# Patient Record
Sex: FEMALE | Race: OTHER | NOT HISPANIC OR LATINO
[De-identification: names, ages, dates, MRNs, and addresses within clinical notes are randomized per-mention and may not be internally consistent; named-entity substitution may affect disease eponyms.]

---

## 2017-05-25 ENCOUNTER — RESULT REVIEW (OUTPATIENT)
Age: 27
End: 2017-05-25

## 2017-07-06 ENCOUNTER — ASOB RESULT (OUTPATIENT)
Age: 27
End: 2017-07-06

## 2017-07-06 ENCOUNTER — APPOINTMENT (OUTPATIENT)
Dept: ANTEPARTUM | Facility: CLINIC | Age: 27
End: 2017-07-06

## 2017-08-24 ENCOUNTER — APPOINTMENT (OUTPATIENT)
Dept: ANTEPARTUM | Facility: CLINIC | Age: 27
End: 2017-08-24
Payer: COMMERCIAL

## 2017-08-24 ENCOUNTER — ASOB RESULT (OUTPATIENT)
Age: 27
End: 2017-08-24

## 2017-08-24 PROCEDURE — 76811 OB US DETAILED SNGL FETUS: CPT

## 2017-09-20 ENCOUNTER — LABORATORY RESULT (OUTPATIENT)
Age: 27
End: 2017-09-20

## 2017-09-20 ENCOUNTER — ASOB RESULT (OUTPATIENT)
Age: 27
End: 2017-09-20

## 2017-09-20 ENCOUNTER — APPOINTMENT (OUTPATIENT)
Dept: ANTEPARTUM | Facility: CLINIC | Age: 27
End: 2017-09-20
Payer: COMMERCIAL

## 2017-09-20 DIAGNOSIS — O35.9XX0 MATERNAL CARE FOR (SUSPECTED) FETAL ABNORMALITY AND DAMAGE, UNSPECIFIED, NOT APPLICABLE OR UNSPECIFIED: ICD-10-CM

## 2017-09-20 PROCEDURE — 99241 OFFICE CONSULTATION NEW/ESTAB PATIENT 15 MIN: CPT | Mod: 25

## 2017-09-20 PROCEDURE — 76816 OB US FOLLOW-UP PER FETUS: CPT

## 2017-09-20 PROCEDURE — 76816 OB US FOLLOW-UP PER FETUS: CPT | Mod: 59

## 2017-09-25 ENCOUNTER — FORM ENCOUNTER (OUTPATIENT)
Age: 27
End: 2017-09-25

## 2017-09-26 ENCOUNTER — OUTPATIENT (OUTPATIENT)
Dept: OUTPATIENT SERVICES | Age: 27
LOS: 1 days | End: 2017-09-26

## 2017-09-26 ENCOUNTER — APPOINTMENT (OUTPATIENT)
Dept: MRI IMAGING | Facility: HOSPITAL | Age: 27
End: 2017-09-26
Payer: COMMERCIAL

## 2017-09-26 DIAGNOSIS — O35.9XX0 MATERNAL CARE FOR (SUSPECTED) FETAL ABNORMALITY AND DAMAGE, UNSPECIFIED, NOT APPLICABLE OR UNSPECIFIED: ICD-10-CM

## 2017-09-26 PROCEDURE — 74712 MRI FETAL SNGL/1ST GESTATION: CPT | Mod: 26

## 2017-10-20 ENCOUNTER — APPOINTMENT (OUTPATIENT)
Dept: ANTEPARTUM | Facility: CLINIC | Age: 27
End: 2017-10-20
Payer: COMMERCIAL

## 2017-10-20 ENCOUNTER — ASOB RESULT (OUTPATIENT)
Age: 27
End: 2017-10-20

## 2017-10-20 PROCEDURE — 76816 OB US FOLLOW-UP PER FETUS: CPT

## 2017-11-15 ENCOUNTER — APPOINTMENT (OUTPATIENT)
Dept: ANTEPARTUM | Facility: CLINIC | Age: 27
End: 2017-11-15
Payer: COMMERCIAL

## 2017-11-15 ENCOUNTER — ASOB RESULT (OUTPATIENT)
Age: 27
End: 2017-11-15

## 2017-11-15 PROCEDURE — 76816 OB US FOLLOW-UP PER FETUS: CPT

## 2017-12-14 ENCOUNTER — APPOINTMENT (OUTPATIENT)
Dept: ANTEPARTUM | Facility: CLINIC | Age: 27
End: 2017-12-14

## 2017-12-14 ENCOUNTER — ASOB RESULT (OUTPATIENT)
Age: 27
End: 2017-12-14

## 2017-12-14 ENCOUNTER — APPOINTMENT (OUTPATIENT)
Dept: ANTEPARTUM | Facility: CLINIC | Age: 27
End: 2017-12-14
Payer: COMMERCIAL

## 2017-12-14 PROCEDURE — 76816 OB US FOLLOW-UP PER FETUS: CPT

## 2017-12-18 ENCOUNTER — APPOINTMENT (OUTPATIENT)
Dept: ANTEPARTUM | Facility: CLINIC | Age: 27
End: 2017-12-18
Payer: COMMERCIAL

## 2017-12-18 ENCOUNTER — OUTPATIENT (OUTPATIENT)
Dept: OUTPATIENT SERVICES | Facility: HOSPITAL | Age: 27
LOS: 1 days | End: 2017-12-18
Payer: COMMERCIAL

## 2017-12-18 ENCOUNTER — ASOB RESULT (OUTPATIENT)
Age: 27
End: 2017-12-18

## 2017-12-18 PROCEDURE — 76818 FETAL BIOPHYS PROFILE W/NST: CPT | Mod: 26

## 2017-12-18 PROCEDURE — 76818 FETAL BIOPHYS PROFILE W/NST: CPT

## 2017-12-19 ENCOUNTER — OUTPATIENT (OUTPATIENT)
Dept: OUTPATIENT SERVICES | Facility: HOSPITAL | Age: 27
LOS: 1 days | End: 2017-12-19
Payer: COMMERCIAL

## 2017-12-19 DIAGNOSIS — O26.899 OTHER SPECIFIED PREGNANCY RELATED CONDITIONS, UNSPECIFIED TRIMESTER: ICD-10-CM

## 2017-12-19 DIAGNOSIS — Z3A.00 WEEKS OF GESTATION OF PREGNANCY NOT SPECIFIED: ICD-10-CM

## 2017-12-19 LAB
ALBUMIN SERPL ELPH-MCNC: 3 G/DL — LOW (ref 3.3–5)
ALP SERPL-CCNC: 140 U/L — HIGH (ref 40–120)
ALT FLD-CCNC: 13 U/L RC — SIGNIFICANT CHANGE UP (ref 10–45)
ANION GAP SERPL CALC-SCNC: 12 MMOL/L — SIGNIFICANT CHANGE UP (ref 5–17)
APPEARANCE UR: CLEAR — SIGNIFICANT CHANGE UP
APTT BLD: 27.3 SEC — LOW (ref 27.5–37.4)
AST SERPL-CCNC: 19 U/L — SIGNIFICANT CHANGE UP (ref 10–40)
BACTERIA # UR AUTO: ABNORMAL /HPF
BASOPHILS # BLD AUTO: 0.1 K/UL — SIGNIFICANT CHANGE UP (ref 0–0.2)
BASOPHILS NFR BLD AUTO: 0.7 % — SIGNIFICANT CHANGE UP (ref 0–2)
BILIRUB SERPL-MCNC: 0.2 MG/DL — SIGNIFICANT CHANGE UP (ref 0.2–1.2)
BILIRUB UR-MCNC: NEGATIVE — SIGNIFICANT CHANGE UP
BUN SERPL-MCNC: 15 MG/DL — SIGNIFICANT CHANGE UP (ref 7–23)
CALCIUM SERPL-MCNC: 8.9 MG/DL — SIGNIFICANT CHANGE UP (ref 8.4–10.5)
CHLORIDE SERPL-SCNC: 104 MMOL/L — SIGNIFICANT CHANGE UP (ref 96–108)
CO2 SERPL-SCNC: 23 MMOL/L — SIGNIFICANT CHANGE UP (ref 22–31)
COLOR SPEC: YELLOW — SIGNIFICANT CHANGE UP
COMMENT - URINE: SIGNIFICANT CHANGE UP
CREAT SERPL-MCNC: 0.88 MG/DL — SIGNIFICANT CHANGE UP (ref 0.5–1.3)
DIFF PNL FLD: ABNORMAL
EOSINOPHIL # BLD AUTO: 0.2 K/UL — SIGNIFICANT CHANGE UP (ref 0–0.5)
EOSINOPHIL NFR BLD AUTO: 1.5 % — SIGNIFICANT CHANGE UP (ref 0–6)
EPI CELLS # UR: SIGNIFICANT CHANGE UP /HPF
FIBRINOGEN PPP-MCNC: 596 MG/DL — HIGH (ref 310–510)
GLUCOSE SERPL-MCNC: 76 MG/DL — SIGNIFICANT CHANGE UP (ref 70–99)
GLUCOSE UR QL: NEGATIVE — SIGNIFICANT CHANGE UP
HCT VFR BLD CALC: 41 % — SIGNIFICANT CHANGE UP (ref 34.5–45)
HGB BLD-MCNC: 14 G/DL — SIGNIFICANT CHANGE UP (ref 11.5–15.5)
HYALINE CASTS # UR AUTO: ABNORMAL
INR BLD: 0.91 RATIO — SIGNIFICANT CHANGE UP (ref 0.88–1.16)
KETONES UR-MCNC: NEGATIVE — SIGNIFICANT CHANGE UP
LDH SERPL L TO P-CCNC: 221 U/L — SIGNIFICANT CHANGE UP (ref 50–242)
LEUKOCYTE ESTERASE UR-ACNC: NEGATIVE — SIGNIFICANT CHANGE UP
LYMPHOCYTES # BLD AUTO: 2.8 K/UL — SIGNIFICANT CHANGE UP (ref 1–3.3)
LYMPHOCYTES # BLD AUTO: 27.5 % — SIGNIFICANT CHANGE UP (ref 13–44)
MCHC RBC-ENTMCNC: 31.8 PG — SIGNIFICANT CHANGE UP (ref 27–34)
MCHC RBC-ENTMCNC: 34.1 GM/DL — SIGNIFICANT CHANGE UP (ref 32–36)
MCV RBC AUTO: 93.4 FL — SIGNIFICANT CHANGE UP (ref 80–100)
MONOCYTES # BLD AUTO: 0.7 K/UL — SIGNIFICANT CHANGE UP (ref 0–0.9)
MONOCYTES NFR BLD AUTO: 7.3 % — SIGNIFICANT CHANGE UP (ref 2–14)
NEUTROPHILS # BLD AUTO: 6.3 K/UL — SIGNIFICANT CHANGE UP (ref 1.8–7.4)
NEUTROPHILS NFR BLD AUTO: 63 % — SIGNIFICANT CHANGE UP (ref 43–77)
NITRITE UR-MCNC: NEGATIVE — SIGNIFICANT CHANGE UP
PH UR: 6.5 — SIGNIFICANT CHANGE UP (ref 5–8)
PLATELET # BLD AUTO: 197 K/UL — SIGNIFICANT CHANGE UP (ref 150–400)
POTASSIUM SERPL-MCNC: 4.8 MMOL/L — SIGNIFICANT CHANGE UP (ref 3.5–5.3)
POTASSIUM SERPL-SCNC: 4.8 MMOL/L — SIGNIFICANT CHANGE UP (ref 3.5–5.3)
PROT SERPL-MCNC: 5.4 G/DL — LOW (ref 6–8.3)
PROT UR-MCNC: 500 MG/DL
PROTHROM AB SERPL-ACNC: 9.8 SEC — SIGNIFICANT CHANGE UP (ref 9.8–12.7)
RBC # BLD: 4.39 M/UL — SIGNIFICANT CHANGE UP (ref 3.8–5.2)
RBC # FLD: 13.1 % — SIGNIFICANT CHANGE UP (ref 10.3–14.5)
RBC CASTS # UR COMP ASSIST: ABNORMAL /HPF (ref 0–2)
SODIUM SERPL-SCNC: 139 MMOL/L — SIGNIFICANT CHANGE UP (ref 135–145)
SP GR SPEC: 1.03 — HIGH (ref 1.01–1.02)
URATE SERPL-MCNC: 1.9 MG/DL — LOW (ref 2.5–7)
UROBILINOGEN FLD QL: NEGATIVE — SIGNIFICANT CHANGE UP
WBC # BLD: 10.1 K/UL — SIGNIFICANT CHANGE UP (ref 3.8–10.5)
WBC # FLD AUTO: 10.1 K/UL — SIGNIFICANT CHANGE UP (ref 3.8–10.5)
WBC UR QL: SIGNIFICANT CHANGE UP /HPF (ref 0–5)

## 2017-12-19 PROCEDURE — 80053 COMPREHEN METABOLIC PANEL: CPT

## 2017-12-19 PROCEDURE — G0463: CPT

## 2017-12-19 PROCEDURE — 83615 LACTATE (LD) (LDH) ENZYME: CPT

## 2017-12-19 PROCEDURE — 59025 FETAL NON-STRESS TEST: CPT

## 2017-12-19 PROCEDURE — 84156 ASSAY OF PROTEIN URINE: CPT

## 2017-12-19 PROCEDURE — 85384 FIBRINOGEN ACTIVITY: CPT

## 2017-12-19 PROCEDURE — 84550 ASSAY OF BLOOD/URIC ACID: CPT

## 2017-12-19 PROCEDURE — 85730 THROMBOPLASTIN TIME PARTIAL: CPT

## 2017-12-19 PROCEDURE — 85027 COMPLETE CBC AUTOMATED: CPT

## 2017-12-19 PROCEDURE — 85610 PROTHROMBIN TIME: CPT

## 2017-12-19 PROCEDURE — 86780 TREPONEMA PALLIDUM: CPT

## 2017-12-19 PROCEDURE — 81001 URINALYSIS AUTO W/SCOPE: CPT

## 2017-12-19 RX ORDER — ACETAMINOPHEN 500 MG
975 TABLET ORAL ONCE
Qty: 0 | Refills: 0 | Status: COMPLETED | OUTPATIENT
Start: 2017-12-19 | End: 2017-12-19

## 2017-12-19 RX ADMIN — Medication 975 MILLIGRAM(S): at 21:35

## 2017-12-20 DIAGNOSIS — Z01.818 ENCOUNTER FOR OTHER PREPROCEDURAL EXAMINATION: ICD-10-CM

## 2017-12-20 LAB
CREAT ?TM UR-MCNC: 168 MG/DL — SIGNIFICANT CHANGE UP
PROT ?TM UR-MCNC: 625 MG/DL — HIGH (ref 0–12)
PROT/CREAT UR-RTO: 3.7 RATIO — HIGH (ref 0–0.2)
T PALLIDUM AB TITR SER: NEGATIVE — SIGNIFICANT CHANGE UP

## 2017-12-21 ENCOUNTER — OUTPATIENT (OUTPATIENT)
Dept: OUTPATIENT SERVICES | Facility: HOSPITAL | Age: 27
LOS: 1 days | End: 2017-12-21
Payer: COMMERCIAL

## 2017-12-21 DIAGNOSIS — O35.0XX0 MATERNAL CARE FOR (SUSPECTED) CENTRAL NERVOUS SYSTEM MALFORMATION IN FETUS, NOT APPLICABLE OR UNSPECIFIED: ICD-10-CM

## 2017-12-21 DIAGNOSIS — O34.219 MATERNAL CARE FOR UNSPECIFIED TYPE SCAR FROM PREVIOUS CESAREAN DELIVERY: ICD-10-CM

## 2017-12-21 DIAGNOSIS — Z01.818 ENCOUNTER FOR OTHER PREPROCEDURAL EXAMINATION: ICD-10-CM

## 2017-12-21 LAB
BLD GP AB SCN SERPL QL: NEGATIVE — SIGNIFICANT CHANGE UP
HCT VFR BLD CALC: 41 % — SIGNIFICANT CHANGE UP (ref 34.5–45)
HGB BLD-MCNC: 13.6 G/DL — SIGNIFICANT CHANGE UP (ref 11.5–15.5)
MCHC RBC-ENTMCNC: 29.7 PG — SIGNIFICANT CHANGE UP (ref 27–34)
MCHC RBC-ENTMCNC: 33.2 GM/DL — SIGNIFICANT CHANGE UP (ref 32–36)
MCV RBC AUTO: 89.5 FL — SIGNIFICANT CHANGE UP (ref 80–100)
PLATELET # BLD AUTO: 199 K/UL — SIGNIFICANT CHANGE UP (ref 150–400)
RBC # BLD: 4.58 M/UL — SIGNIFICANT CHANGE UP (ref 3.8–5.2)
RBC # FLD: 14.1 % — SIGNIFICANT CHANGE UP (ref 10.3–14.5)
RH IG SCN BLD-IMP: POSITIVE — SIGNIFICANT CHANGE UP
WBC # BLD: 9.45 K/UL — SIGNIFICANT CHANGE UP (ref 3.8–10.5)
WBC # FLD AUTO: 9.45 K/UL — SIGNIFICANT CHANGE UP (ref 3.8–10.5)

## 2017-12-21 PROCEDURE — G0463: CPT

## 2017-12-21 PROCEDURE — 86900 BLOOD TYPING SEROLOGIC ABO: CPT

## 2017-12-21 PROCEDURE — 86850 RBC ANTIBODY SCREEN: CPT

## 2017-12-21 PROCEDURE — 85027 COMPLETE CBC AUTOMATED: CPT

## 2017-12-21 PROCEDURE — 86901 BLOOD TYPING SEROLOGIC RH(D): CPT

## 2017-12-21 RX ORDER — SODIUM CHLORIDE 9 MG/ML
1000 INJECTION, SOLUTION INTRAVENOUS ONCE
Qty: 0 | Refills: 0 | Status: COMPLETED | OUTPATIENT
Start: 2017-12-22 | End: 2017-12-22

## 2017-12-21 RX ORDER — LIDOCAINE HCL 20 MG/ML
0.2 VIAL (ML) INJECTION ONCE
Qty: 0 | Refills: 0 | Status: DISCONTINUED | OUTPATIENT
Start: 2017-12-23 | End: 2017-12-23

## 2017-12-21 RX ORDER — SODIUM CHLORIDE 9 MG/ML
1000 INJECTION, SOLUTION INTRAVENOUS
Qty: 0 | Refills: 0 | Status: DISCONTINUED | OUTPATIENT
Start: 2017-12-22 | End: 2017-12-23

## 2017-12-22 ENCOUNTER — INPATIENT (INPATIENT)
Facility: HOSPITAL | Age: 27
LOS: 2 days | Discharge: HOME | End: 2017-12-25
Attending: OBSTETRICS & GYNECOLOGY | Admitting: OBSTETRICS & GYNECOLOGY
Payer: COMMERCIAL

## 2017-12-22 VITALS — WEIGHT: 182.98 LBS

## 2017-12-22 DIAGNOSIS — O34.219 MATERNAL CARE FOR UNSPECIFIED TYPE SCAR FROM PREVIOUS CESAREAN DELIVERY: ICD-10-CM

## 2017-12-22 LAB
ALBUMIN SERPL ELPH-MCNC: 2.9 G/DL — LOW (ref 3.3–5)
ALP SERPL-CCNC: 137 U/L — HIGH (ref 40–120)
ALT FLD-CCNC: 15 U/L RC — SIGNIFICANT CHANGE UP (ref 10–45)
ANION GAP SERPL CALC-SCNC: 12 MMOL/L — SIGNIFICANT CHANGE UP (ref 5–17)
APPEARANCE UR: ABNORMAL
APTT BLD: 27.1 SEC — LOW (ref 27.5–37.4)
AST SERPL-CCNC: 21 U/L — SIGNIFICANT CHANGE UP (ref 10–40)
BACTERIA # UR AUTO: ABNORMAL /HPF
BASOPHILS # BLD AUTO: 0.1 K/UL — SIGNIFICANT CHANGE UP (ref 0–0.2)
BASOPHILS NFR BLD AUTO: 0.6 % — SIGNIFICANT CHANGE UP (ref 0–2)
BILIRUB SERPL-MCNC: 0.2 MG/DL — SIGNIFICANT CHANGE UP (ref 0.2–1.2)
BILIRUB UR-MCNC: NEGATIVE — SIGNIFICANT CHANGE UP
BUN SERPL-MCNC: 13 MG/DL — SIGNIFICANT CHANGE UP (ref 7–23)
CALCIUM SERPL-MCNC: 8.5 MG/DL — SIGNIFICANT CHANGE UP (ref 8.4–10.5)
CHLORIDE SERPL-SCNC: 105 MMOL/L — SIGNIFICANT CHANGE UP (ref 96–108)
CO2 SERPL-SCNC: 21 MMOL/L — LOW (ref 22–31)
COLOR SPEC: YELLOW — SIGNIFICANT CHANGE UP
COMMENT - URINE: SIGNIFICANT CHANGE UP
CREAT ?TM UR-MCNC: 334 MG/DL — SIGNIFICANT CHANGE UP
CREAT SERPL-MCNC: 0.79 MG/DL — SIGNIFICANT CHANGE UP (ref 0.5–1.3)
DIFF PNL FLD: ABNORMAL
EOSINOPHIL # BLD AUTO: 0.1 K/UL — SIGNIFICANT CHANGE UP (ref 0–0.5)
EOSINOPHIL NFR BLD AUTO: 0.6 % — SIGNIFICANT CHANGE UP (ref 0–6)
EPI CELLS # UR: SIGNIFICANT CHANGE UP /HPF
FIBRINOGEN PPP-MCNC: 597 MG/DL — HIGH (ref 310–510)
GLUCOSE SERPL-MCNC: 68 MG/DL — LOW (ref 70–99)
GLUCOSE UR QL: NEGATIVE — SIGNIFICANT CHANGE UP
HCT VFR BLD CALC: 41.8 % — SIGNIFICANT CHANGE UP (ref 34.5–45)
HGB BLD-MCNC: 14.5 G/DL — SIGNIFICANT CHANGE UP (ref 11.5–15.5)
HYALINE CASTS # UR AUTO: ABNORMAL
INR BLD: 0.87 RATIO — LOW (ref 0.88–1.16)
KETONES UR-MCNC: NEGATIVE — SIGNIFICANT CHANGE UP
LDH SERPL L TO P-CCNC: 266 U/L — HIGH (ref 50–242)
LEUKOCYTE ESTERASE UR-ACNC: NEGATIVE — SIGNIFICANT CHANGE UP
LYMPHOCYTES # BLD AUTO: 2.1 K/UL — SIGNIFICANT CHANGE UP (ref 1–3.3)
LYMPHOCYTES # BLD AUTO: 23.1 % — SIGNIFICANT CHANGE UP (ref 13–44)
MCHC RBC-ENTMCNC: 32.5 PG — SIGNIFICANT CHANGE UP (ref 27–34)
MCHC RBC-ENTMCNC: 34.6 GM/DL — SIGNIFICANT CHANGE UP (ref 32–36)
MCV RBC AUTO: 93.9 FL — SIGNIFICANT CHANGE UP (ref 80–100)
MONOCYTES # BLD AUTO: 0.6 K/UL — SIGNIFICANT CHANGE UP (ref 0–0.9)
MONOCYTES NFR BLD AUTO: 6.3 % — SIGNIFICANT CHANGE UP (ref 2–14)
NEUTROPHILS # BLD AUTO: 6.4 K/UL — SIGNIFICANT CHANGE UP (ref 1.8–7.4)
NEUTROPHILS NFR BLD AUTO: 69.5 % — SIGNIFICANT CHANGE UP (ref 43–77)
NITRITE UR-MCNC: NEGATIVE — SIGNIFICANT CHANGE UP
PH UR: 6.5 — SIGNIFICANT CHANGE UP (ref 5–8)
PLATELET # BLD AUTO: 197 K/UL — SIGNIFICANT CHANGE UP (ref 150–400)
POTASSIUM SERPL-MCNC: 4.6 MMOL/L — SIGNIFICANT CHANGE UP (ref 3.5–5.3)
POTASSIUM SERPL-SCNC: 4.6 MMOL/L — SIGNIFICANT CHANGE UP (ref 3.5–5.3)
PROT ?TM UR-MCNC: 1623 MG/DL — HIGH (ref 0–12)
PROT SERPL-MCNC: 6 G/DL — SIGNIFICANT CHANGE UP (ref 6–8.3)
PROT UR-MCNC: >600 MG/DL
PROT/CREAT UR-RTO: 4.9 RATIO — HIGH (ref 0–0.2)
PROTHROM AB SERPL-ACNC: 9.5 SEC — LOW (ref 9.8–12.7)
RBC # BLD: 4.45 M/UL — SIGNIFICANT CHANGE UP (ref 3.8–5.2)
RBC # FLD: 13.3 % — SIGNIFICANT CHANGE UP (ref 10.3–14.5)
RBC CASTS # UR COMP ASSIST: SIGNIFICANT CHANGE UP /HPF (ref 0–2)
SODIUM SERPL-SCNC: 138 MMOL/L — SIGNIFICANT CHANGE UP (ref 135–145)
SP GR SPEC: >1.03 — HIGH (ref 1.01–1.02)
URATE SERPL-MCNC: 2.2 MG/DL — LOW (ref 2.5–7)
UROBILINOGEN FLD QL: NEGATIVE — SIGNIFICANT CHANGE UP
WBC # BLD: 9.2 K/UL — SIGNIFICANT CHANGE UP (ref 3.8–10.5)
WBC # FLD AUTO: 9.2 K/UL — SIGNIFICANT CHANGE UP (ref 3.8–10.5)
WBC UR QL: ABNORMAL /HPF (ref 0–5)

## 2017-12-22 RX ORDER — FAMOTIDINE 10 MG/ML
20 INJECTION INTRAVENOUS ONCE
Qty: 0 | Refills: 0 | Status: COMPLETED | OUTPATIENT
Start: 2017-12-22 | End: 2017-12-22

## 2017-12-22 RX ORDER — KETOROLAC TROMETHAMINE 30 MG/ML
30 SYRINGE (ML) INJECTION EVERY 6 HOURS
Qty: 0 | Refills: 0 | Status: DISCONTINUED | OUTPATIENT
Start: 2017-12-22 | End: 2017-12-23

## 2017-12-22 RX ORDER — DOCUSATE SODIUM 100 MG
100 CAPSULE ORAL
Qty: 0 | Refills: 0 | Status: DISCONTINUED | OUTPATIENT
Start: 2017-12-23 | End: 2017-12-25

## 2017-12-22 RX ORDER — OXYTOCIN 10 UNIT/ML
41.67 VIAL (ML) INJECTION
Qty: 20 | Refills: 0 | Status: DISCONTINUED | OUTPATIENT
Start: 2017-12-22 | End: 2017-12-23

## 2017-12-22 RX ORDER — METOCLOPRAMIDE HCL 10 MG
10 TABLET ORAL ONCE
Qty: 0 | Refills: 0 | Status: COMPLETED | OUTPATIENT
Start: 2017-12-22 | End: 2017-12-22

## 2017-12-22 RX ORDER — CEFAZOLIN SODIUM 1 G
2000 VIAL (EA) INJECTION ONCE
Qty: 0 | Refills: 0 | Status: COMPLETED | OUTPATIENT
Start: 2017-12-22 | End: 2017-12-22

## 2017-12-22 RX ORDER — CITRIC ACID/SODIUM CITRATE 300-500 MG
30 SOLUTION, ORAL ORAL ONCE
Qty: 0 | Refills: 0 | Status: COMPLETED | OUTPATIENT
Start: 2017-12-22 | End: 2017-12-22

## 2017-12-22 RX ORDER — ACETAMINOPHEN 500 MG
975 TABLET ORAL EVERY 6 HOURS
Qty: 0 | Refills: 0 | Status: DISCONTINUED | OUTPATIENT
Start: 2017-12-22 | End: 2017-12-25

## 2017-12-22 RX ORDER — DIPHENHYDRAMINE HCL 50 MG
25 CAPSULE ORAL EVERY 6 HOURS
Qty: 0 | Refills: 0 | Status: DISCONTINUED | OUTPATIENT
Start: 2017-12-23 | End: 2017-12-25

## 2017-12-22 RX ORDER — IBUPROFEN 200 MG
600 TABLET ORAL EVERY 6 HOURS
Qty: 0 | Refills: 0 | Status: COMPLETED | OUTPATIENT
Start: 2017-12-22 | End: 2018-11-20

## 2017-12-22 RX ORDER — ACETAMINOPHEN 500 MG
975 TABLET ORAL EVERY 6 HOURS
Qty: 0 | Refills: 0 | Status: DISCONTINUED | OUTPATIENT
Start: 2017-12-22 | End: 2017-12-23

## 2017-12-22 RX ORDER — OXYTOCIN 10 UNIT/ML
333.33 VIAL (ML) INJECTION
Qty: 20 | Refills: 0 | Status: DISCONTINUED | OUTPATIENT
Start: 2017-12-22 | End: 2017-12-23

## 2017-12-22 RX ORDER — IBUPROFEN 200 MG
600 TABLET ORAL EVERY 6 HOURS
Qty: 0 | Refills: 0 | Status: DISCONTINUED | OUTPATIENT
Start: 2017-12-22 | End: 2017-12-23

## 2017-12-22 RX ORDER — OXYCODONE HYDROCHLORIDE 5 MG/1
5 TABLET ORAL EVERY 4 HOURS
Qty: 0 | Refills: 0 | Status: COMPLETED | OUTPATIENT
Start: 2017-12-22 | End: 2017-12-29

## 2017-12-22 RX ORDER — SODIUM CHLORIDE 9 MG/ML
500 INJECTION, SOLUTION INTRAVENOUS ONCE
Qty: 0 | Refills: 0 | Status: COMPLETED | OUTPATIENT
Start: 2017-12-22 | End: 2017-12-22

## 2017-12-22 RX ORDER — OXYCODONE HYDROCHLORIDE 5 MG/1
5 TABLET ORAL
Qty: 0 | Refills: 0 | Status: COMPLETED | OUTPATIENT
Start: 2017-12-22 | End: 2017-12-29

## 2017-12-22 RX ORDER — SODIUM CHLORIDE 9 MG/ML
1000 INJECTION, SOLUTION INTRAVENOUS
Qty: 0 | Refills: 0 | Status: DISCONTINUED | OUTPATIENT
Start: 2017-12-22 | End: 2017-12-23

## 2017-12-22 RX ORDER — MAGNESIUM SULFATE 500 MG/ML
4 VIAL (ML) INJECTION ONCE
Qty: 0 | Refills: 0 | Status: COMPLETED | OUTPATIENT
Start: 2017-12-22 | End: 2017-12-23

## 2017-12-22 RX ORDER — KETOROLAC TROMETHAMINE 30 MG/ML
30 SYRINGE (ML) INJECTION EVERY 6 HOURS
Qty: 0 | Refills: 0 | Status: DISCONTINUED | OUTPATIENT
Start: 2017-12-22 | End: 2017-12-22

## 2017-12-22 RX ORDER — TETANUS TOXOID, REDUCED DIPHTHERIA TOXOID AND ACELLULAR PERTUSSIS VACCINE, ADSORBED 5; 2.5; 8; 8; 2.5 [IU]/.5ML; [IU]/.5ML; UG/.5ML; UG/.5ML; UG/.5ML
0.5 SUSPENSION INTRAMUSCULAR ONCE
Qty: 0 | Refills: 0 | Status: DISCONTINUED | OUTPATIENT
Start: 2017-12-23 | End: 2017-12-25

## 2017-12-22 RX ORDER — OXYCODONE HYDROCHLORIDE 5 MG/1
5 TABLET ORAL EVERY 4 HOURS
Qty: 0 | Refills: 0 | Status: DISCONTINUED | OUTPATIENT
Start: 2017-12-22 | End: 2017-12-23

## 2017-12-22 RX ORDER — MAGNESIUM SULFATE 500 MG/ML
2 VIAL (ML) INJECTION
Qty: 40 | Refills: 0 | Status: DISCONTINUED | OUTPATIENT
Start: 2017-12-22 | End: 2017-12-23

## 2017-12-22 RX ORDER — OXYTOCIN 10 UNIT/ML
41.67 VIAL (ML) INJECTION
Qty: 20 | Refills: 0 | Status: DISCONTINUED | OUTPATIENT
Start: 2017-12-23 | End: 2017-12-25

## 2017-12-22 RX ORDER — OXYCODONE HYDROCHLORIDE 5 MG/1
5 TABLET ORAL
Qty: 0 | Refills: 0 | Status: DISCONTINUED | OUTPATIENT
Start: 2017-12-22 | End: 2017-12-23

## 2017-12-22 RX ORDER — NALOXONE HYDROCHLORIDE 4 MG/.1ML
0.1 SPRAY NASAL
Qty: 0 | Refills: 0 | Status: DISCONTINUED | OUTPATIENT
Start: 2017-12-22 | End: 2017-12-23

## 2017-12-22 RX ORDER — LANOLIN
1 OINTMENT (GRAM) TOPICAL
Qty: 0 | Refills: 0 | Status: DISCONTINUED | OUTPATIENT
Start: 2017-12-23 | End: 2017-12-25

## 2017-12-22 RX ORDER — FERROUS SULFATE 325(65) MG
325 TABLET ORAL DAILY
Qty: 0 | Refills: 0 | Status: DISCONTINUED | OUTPATIENT
Start: 2017-12-23 | End: 2017-12-25

## 2017-12-22 RX ORDER — ONDANSETRON 8 MG/1
4 TABLET, FILM COATED ORAL EVERY 6 HOURS
Qty: 0 | Refills: 0 | Status: DISCONTINUED | OUTPATIENT
Start: 2017-12-22 | End: 2017-12-23

## 2017-12-22 RX ORDER — HEPARIN SODIUM 5000 [USP'U]/ML
5000 INJECTION INTRAVENOUS; SUBCUTANEOUS EVERY 12 HOURS
Qty: 0 | Refills: 0 | Status: DISCONTINUED | OUTPATIENT
Start: 2017-12-23 | End: 2017-12-25

## 2017-12-22 RX ORDER — DEXAMETHASONE 0.5 MG/5ML
4 ELIXIR ORAL EVERY 6 HOURS
Qty: 0 | Refills: 0 | Status: DISCONTINUED | OUTPATIENT
Start: 2017-12-22 | End: 2017-12-23

## 2017-12-22 RX ORDER — GLYCERIN ADULT
1 SUPPOSITORY, RECTAL RECTAL AT BEDTIME
Qty: 0 | Refills: 0 | Status: DISCONTINUED | OUTPATIENT
Start: 2017-12-23 | End: 2017-12-25

## 2017-12-22 RX ORDER — SIMETHICONE 80 MG/1
80 TABLET, CHEWABLE ORAL EVERY 4 HOURS
Qty: 0 | Refills: 0 | Status: DISCONTINUED | OUTPATIENT
Start: 2017-12-23 | End: 2017-12-25

## 2017-12-22 RX ADMIN — ONDANSETRON 4 MILLIGRAM(S): 8 TABLET, FILM COATED ORAL at 21:21

## 2017-12-22 RX ADMIN — SODIUM CHLORIDE 1000 MILLILITER(S): 9 INJECTION, SOLUTION INTRAVENOUS at 22:15

## 2017-12-22 RX ADMIN — Medication 100 MILLIGRAM(S): at 16:45

## 2017-12-22 RX ADMIN — SODIUM CHLORIDE 2000 MILLILITER(S): 9 INJECTION, SOLUTION INTRAVENOUS at 15:00

## 2017-12-22 RX ADMIN — Medication 10 MILLIGRAM(S): at 19:09

## 2017-12-22 RX ADMIN — Medication 30 MILLILITER(S): at 16:14

## 2017-12-22 RX ADMIN — FAMOTIDINE 20 MILLIGRAM(S): 10 INJECTION INTRAVENOUS at 16:14

## 2017-12-22 NOTE — PROVIDER CONTACT NOTE (OTHER) - ASSESSMENT
Pt BPs are 140s/90s   Pt reporting mild headache 3/10 pain, pt denies any visual disturbances or epigastric pain  Bleeding is WNL, uterus firm and 1 below the umbilicus and urine output is 20ml hr

## 2017-12-23 ENCOUNTER — TRANSCRIPTION ENCOUNTER (OUTPATIENT)
Age: 27
End: 2017-12-23

## 2017-12-23 LAB
ALBUMIN SERPL ELPH-MCNC: 2.4 G/DL — LOW (ref 3.3–5)
ALP SERPL-CCNC: 112 U/L — SIGNIFICANT CHANGE UP (ref 40–120)
ALT FLD-CCNC: 11 U/L RC — SIGNIFICANT CHANGE UP (ref 10–45)
ANION GAP SERPL CALC-SCNC: 10 MMOL/L — SIGNIFICANT CHANGE UP (ref 5–17)
APPEARANCE UR: ABNORMAL
APTT BLD: 27.2 SEC — LOW (ref 27.5–37.4)
AST SERPL-CCNC: 22 U/L — SIGNIFICANT CHANGE UP (ref 10–40)
BASOPHILS # BLD AUTO: 0 K/UL — SIGNIFICANT CHANGE UP (ref 0–0.2)
BASOPHILS # BLD AUTO: 0.1 K/UL — SIGNIFICANT CHANGE UP (ref 0–0.2)
BASOPHILS NFR BLD AUTO: 0.3 % — SIGNIFICANT CHANGE UP (ref 0–2)
BASOPHILS NFR BLD AUTO: 0.5 % — SIGNIFICANT CHANGE UP (ref 0–2)
BILIRUB SERPL-MCNC: 0.3 MG/DL — SIGNIFICANT CHANGE UP (ref 0.2–1.2)
BILIRUB UR-MCNC: NEGATIVE — SIGNIFICANT CHANGE UP
BUN SERPL-MCNC: 11 MG/DL — SIGNIFICANT CHANGE UP (ref 7–23)
CALCIUM SERPL-MCNC: 7.8 MG/DL — LOW (ref 8.4–10.5)
CHLORIDE SERPL-SCNC: 104 MMOL/L — SIGNIFICANT CHANGE UP (ref 96–108)
CO2 SERPL-SCNC: 20 MMOL/L — LOW (ref 22–31)
COLOR SPEC: YELLOW — SIGNIFICANT CHANGE UP
COMMENT - URINE: SIGNIFICANT CHANGE UP
CREAT SERPL-MCNC: 0.61 MG/DL — SIGNIFICANT CHANGE UP (ref 0.5–1.3)
DIFF PNL FLD: ABNORMAL
EOSINOPHIL # BLD AUTO: 0 K/UL — SIGNIFICANT CHANGE UP (ref 0–0.5)
EOSINOPHIL # BLD AUTO: 0.1 K/UL — SIGNIFICANT CHANGE UP (ref 0–0.5)
EOSINOPHIL NFR BLD AUTO: 0.3 % — SIGNIFICANT CHANGE UP (ref 0–6)
EOSINOPHIL NFR BLD AUTO: 0.5 % — SIGNIFICANT CHANGE UP (ref 0–6)
EPI CELLS # UR: SIGNIFICANT CHANGE UP /HPF
FIBRINOGEN PPP-MCNC: 522 MG/DL — HIGH (ref 310–510)
GLUCOSE SERPL-MCNC: 99 MG/DL — SIGNIFICANT CHANGE UP (ref 70–99)
GLUCOSE UR QL: NEGATIVE — SIGNIFICANT CHANGE UP
HCT VFR BLD CALC: 37.6 % — SIGNIFICANT CHANGE UP (ref 34.5–45)
HCT VFR BLD CALC: 39.3 % — SIGNIFICANT CHANGE UP (ref 34.5–45)
HGB BLD-MCNC: 13.2 G/DL — SIGNIFICANT CHANGE UP (ref 11.5–15.5)
HGB BLD-MCNC: 13.2 G/DL — SIGNIFICANT CHANGE UP (ref 11.5–15.5)
INR BLD: 0.88 RATIO — SIGNIFICANT CHANGE UP (ref 0.88–1.16)
KETONES UR-MCNC: ABNORMAL
LDH SERPL L TO P-CCNC: 268 U/L — HIGH (ref 50–242)
LEUKOCYTE ESTERASE UR-ACNC: NEGATIVE — SIGNIFICANT CHANGE UP
LYMPHOCYTES # BLD AUTO: 14.8 % — SIGNIFICANT CHANGE UP (ref 13–44)
LYMPHOCYTES # BLD AUTO: 17.1 % — SIGNIFICANT CHANGE UP (ref 13–44)
LYMPHOCYTES # BLD AUTO: 2 K/UL — SIGNIFICANT CHANGE UP (ref 1–3.3)
LYMPHOCYTES # BLD AUTO: 2.2 K/UL — SIGNIFICANT CHANGE UP (ref 1–3.3)
MAGNESIUM SERPL-MCNC: 4.9 MG/DL — HIGH (ref 1.6–2.6)
MAGNESIUM SERPL-MCNC: 5.9 MG/DL — HIGH (ref 1.6–2.6)
MCHC RBC-ENTMCNC: 31.3 PG — SIGNIFICANT CHANGE UP (ref 27–34)
MCHC RBC-ENTMCNC: 32.5 PG — SIGNIFICANT CHANGE UP (ref 27–34)
MCHC RBC-ENTMCNC: 33.6 GM/DL — SIGNIFICANT CHANGE UP (ref 32–36)
MCHC RBC-ENTMCNC: 35.1 GM/DL — SIGNIFICANT CHANGE UP (ref 32–36)
MCV RBC AUTO: 92.7 FL — SIGNIFICANT CHANGE UP (ref 80–100)
MCV RBC AUTO: 93 FL — SIGNIFICANT CHANGE UP (ref 80–100)
MONOCYTES # BLD AUTO: 0.9 K/UL — SIGNIFICANT CHANGE UP (ref 0–0.9)
MONOCYTES # BLD AUTO: 0.9 K/UL — SIGNIFICANT CHANGE UP (ref 0–0.9)
MONOCYTES NFR BLD AUTO: 6.7 % — SIGNIFICANT CHANGE UP (ref 2–14)
MONOCYTES NFR BLD AUTO: 6.7 % — SIGNIFICANT CHANGE UP (ref 2–14)
NEUTROPHILS # BLD AUTO: 10.5 K/UL — HIGH (ref 1.8–7.4)
NEUTROPHILS # BLD AUTO: 9.9 K/UL — HIGH (ref 1.8–7.4)
NEUTROPHILS NFR BLD AUTO: 75.2 % — SIGNIFICANT CHANGE UP (ref 43–77)
NEUTROPHILS NFR BLD AUTO: 78 % — HIGH (ref 43–77)
NITRITE UR-MCNC: NEGATIVE — SIGNIFICANT CHANGE UP
PH UR: 6.5 — SIGNIFICANT CHANGE UP (ref 5–8)
PLATELET # BLD AUTO: 167 K/UL — SIGNIFICANT CHANGE UP (ref 150–400)
PLATELET # BLD AUTO: 179 K/UL — SIGNIFICANT CHANGE UP (ref 150–400)
POTASSIUM SERPL-MCNC: 4.1 MMOL/L — SIGNIFICANT CHANGE UP (ref 3.5–5.3)
POTASSIUM SERPL-SCNC: 4.1 MMOL/L — SIGNIFICANT CHANGE UP (ref 3.5–5.3)
PROT SERPL-MCNC: 5 G/DL — LOW (ref 6–8.3)
PROT UR-MCNC: >600 MG/DL
PROTHROM AB SERPL-ACNC: 9.6 SEC — LOW (ref 9.8–12.7)
RBC # BLD: 4.06 M/UL — SIGNIFICANT CHANGE UP (ref 3.8–5.2)
RBC # BLD: 4.23 M/UL — SIGNIFICANT CHANGE UP (ref 3.8–5.2)
RBC # FLD: 13 % — SIGNIFICANT CHANGE UP (ref 10.3–14.5)
RBC # FLD: 13.1 % — SIGNIFICANT CHANGE UP (ref 10.3–14.5)
RBC CASTS # UR COMP ASSIST: >50 /HPF (ref 0–2)
SODIUM SERPL-SCNC: 134 MMOL/L — LOW (ref 135–145)
SP GR SPEC: >1.03 — HIGH (ref 1.01–1.02)
T PALLIDUM AB TITR SER: NEGATIVE — SIGNIFICANT CHANGE UP
URATE SERPL-MCNC: 1.8 MG/DL — LOW (ref 2.5–7)
UROBILINOGEN FLD QL: NEGATIVE — SIGNIFICANT CHANGE UP
WBC # BLD: 13.1 K/UL — HIGH (ref 3.8–10.5)
WBC # BLD: 13.4 K/UL — HIGH (ref 3.8–10.5)
WBC # FLD AUTO: 13.1 K/UL — HIGH (ref 3.8–10.5)
WBC # FLD AUTO: 13.4 K/UL — HIGH (ref 3.8–10.5)
WBC UR QL: ABNORMAL /HPF (ref 0–5)

## 2017-12-23 RX ORDER — SODIUM CHLORIDE 9 MG/ML
1000 INJECTION, SOLUTION INTRAVENOUS
Qty: 0 | Refills: 0 | Status: DISCONTINUED | OUTPATIENT
Start: 2017-12-23 | End: 2017-12-23

## 2017-12-23 RX ADMIN — HEPARIN SODIUM 5000 UNIT(S): 5000 INJECTION INTRAVENOUS; SUBCUTANEOUS at 17:37

## 2017-12-23 RX ADMIN — HEPARIN SODIUM 5000 UNIT(S): 5000 INJECTION INTRAVENOUS; SUBCUTANEOUS at 00:24

## 2017-12-23 RX ADMIN — Medication 30 MILLIGRAM(S): at 17:41

## 2017-12-23 RX ADMIN — Medication 50 GM/HR: at 00:35

## 2017-12-23 RX ADMIN — Medication 30 MILLIGRAM(S): at 18:52

## 2017-12-23 RX ADMIN — Medication 30 MILLIGRAM(S): at 01:40

## 2017-12-23 RX ADMIN — SODIUM CHLORIDE 125 MILLILITER(S): 9 INJECTION, SOLUTION INTRAVENOUS at 07:16

## 2017-12-23 RX ADMIN — Medication 300 GRAM(S): at 00:15

## 2017-12-23 RX ADMIN — Medication 30 MILLIGRAM(S): at 01:12

## 2017-12-23 NOTE — PROGRESS NOTE ADULT - SUBJECTIVE AND OBJECTIVE BOX
Day 1 of Anesthesia Pain Management Service    SUBJECTIVE: I'm okay  Pain Scale Score	At rest: ___ 	With Activity: ___ 	[  x ] Refer to charted pain scores    THERAPY:  [ ] Epidural Bupivacaine 0.0625% and Hydromorphone 10 micrograms/mL  [ ] Epidural Ropivacaine 0.2% plain   [ x ] Epidural Bupivacaine 0.01 % and Fentanyl 3 micrograms/mL  (OB)    Demand dose 3 mL  Lockout  15 minutes   Continuous Rate 10mL    MEDICATIONS  (STANDING):  acetaminophen   Tablet. 975 milliGRAM(s) Oral every 6 hours  acetaminophen   Tablet. 975 milliGRAM(s) Oral every 6 hours  diphtheria/tetanus/pertussis (acellular) Vaccine (ADAcel) 0.5 milliLiter(s) IntraMuscular once  fentaNYL (3 MICROgram(s)/mL) + BUpivacaine 0.01% in 0.9% Sodium Chloride PCEA 250 milliLiter(s) Epidural PCA Continuous  ferrous    sulfate 325 milliGRAM(s) Oral daily  heparin  Injectable 5000 Unit(s) SubCutaneous every 12 hours  ibuprofen  Tablet 600 milliGRAM(s) Oral every 6 hours  ibuprofen  Tablet 600 milliGRAM(s) Oral every 6 hours  ketorolac   Injectable 30 milliGRAM(s) IV Push every 6 hours  lactated ringers. 1000 milliLiter(s) (125 mL/Hr) IV Continuous <Continuous>  lactated ringers. 1000 milliLiter(s) (125 mL/Hr) IV Continuous <Continuous>  lactated ringers. 1000 milliLiter(s) (125 mL/Hr) IV Continuous <Continuous>  lidocaine 1% Injectable 0.2 milliLiter(s) Local Injection once  magnesium sulfate Infusion 2 Gm/Hr (50 mL/Hr) IV Continuous <Continuous>  oxyCODONE    IR 5 milliGRAM(s) Oral every 3 hours  oxyCODONE    IR 5 milliGRAM(s) Oral every 3 hours  oxytocin Infusion 333.333 milliUNIT(s)/Min (1000 mL/Hr) IV Continuous <Continuous>  oxytocin Infusion 41.667 milliUNIT(s)/Min (125 mL/Hr) IV Continuous <Continuous>  oxytocin Infusion 41.667 milliUNIT(s)/Min (125 mL/Hr) IV Continuous <Continuous>  oxytocin Infusion 333.333 milliUNIT(s)/Min (1000 mL/Hr) IV Continuous <Continuous>  oxytocin Infusion 41.667 milliUNIT(s)/Min (125 mL/Hr) IV Continuous <Continuous>  prenatal multivitamin 1 Tablet(s) Oral daily    MEDICATIONS  (PRN):  dexamethasone  Injectable 4 milliGRAM(s) IV Push every 6 hours PRN Nausea, IF ondansetron is ineffective after 30 - 60 minutes  diphenhydrAMINE   Capsule 25 milliGRAM(s) Oral every 6 hours PRN Itching  docusate sodium 100 milliGRAM(s) Oral two times a day PRN Stool Softening  fentaNYL (3 MICROgram(s)/mL) + BUpivacaine 0.01% in 0.9% Sodium Chloride PCEA Rescue Clinician Bolus 5 milliLiter(s) Epidural every 15 minutes PRN Moderate Pain (4 - 6)  glycerin Suppository - Adult 1 Suppository(s) Rectal at bedtime PRN Constipation  lanolin Ointment 1 Application(s) Topical every 3 hours PRN Sore Nipples  naloxone Injectable 0.1 milliGRAM(s) IV Push every 3 minutes PRN For ANY of the following changes in patient status:  A. RR LESS THAN 10 breaths per minute, B. Oxygen saturation LESS THAN 90%, C. Sedation score of 6  ondansetron Injectable 4 milliGRAM(s) IV Push every 6 hours PRN Nausea  oxyCODONE    IR 5 milliGRAM(s) Oral every 4 hours PRN Severe Pain (7 - 10)  oxyCODONE    IR 5 milliGRAM(s) Oral every 4 hours PRN Severe Pain (7 - 10)  simethicone 80 milliGRAM(s) Chew every 4 hours PRN Gas      OBJECTIVE:    Assessment of Epidural Catheter Site: 	    [x  ] Dressing intact	[x ] Site non-tender	[x ] Site without erythema, discharge, edema  [x  ] Epidural tubing and connection checked	[ x] Gross neurological exam within normal limits  [ ] Catheter removed – tip intact		    PT/INR - ( 23 Dec 2017 00:46 )   PT: 9.6 sec;   INR: 0.88 ratio         PTT - ( 23 Dec 2017 00:46 )  PTT:27.2 sec                      13.2   13.1  )-----------( 167      ( 23 Dec 2017 04:50 )             39.3     Vital Signs Last 24 Hrs  T(C): 36.8 (12-23-17 @ 06:50), Max: 36.9 (12-22-17 @ 18:35)  T(F): 98.2 (12-23-17 @ 06:50), Max: 98.4 (12-22-17 @ 18:35)  HR: 82 (12-23-17 @ 10:50) (62 - 100)  BP: 104/66 (12-23-17 @ 10:50) (104/66 - 146/92)  BP(mean): 81 (12-23-17 @ 10:50) (79 - 116)  RR: 21 (12-23-17 @ 10:50) (16 - 32)  SpO2: 97% (12-23-17 @ 10:50) (95% - 100%)      Sedation Score:	[x ] Alert	[ ] Drowsy	[ ] Arousable  [ ] Asleep  [ ] Unresponsive    Side Effects:	[ x] None	[ ] Nausea	[ ] Vomiting  [ ] Pruritus  		[ ] Weakness  [ ] Numbness  [ ] Other:    ASSESSMENT/ PLAN:    Therapy to  be:	[  x] Continue   [ ] Discontinued   [ ] Change to prn Analgesics    Documentation and Verification of current medications:  [ X ] Done	[ ] Not done, not eligible, reason:    Comments:

## 2017-12-23 NOTE — PROGRESS NOTE ADULT - ASSESSMENT
42 year old  at 33 weeks 1 days GA JACK 2018 with HF diagnosed in pregnancy EF  21% admitted for scheduled admission for cardiac optimization. HD 25, cardiovascularly stable.    #CHF  -No signs of CHF exacerbation this AM, no chest pain.   - Normal sinus with occasional sinus tach on tele overnight as per tele nurse, HR 80s-100s.   -Telemetry, daily weights  -Continue with Toprol, Hydralazine, Lasix  -Appreciate Cardiology recs for optimization  -plan for delivery between 35 and 36 weeks or sooner if indicated; as per heart failure team, magnesium and procardia acceptable for  labor if indicated    -Consensus from interdisciplinary meeting on 17: based on increased blood volume as pregnancy progresses, opinion of Cardiology team is to maintain in hospital, especially as the patient has had multiple ED visits. The timing of delivery, by consensus, would be sometime between 35-36 weeks, depending on the clinical circumstances. The route of delivery will also depend on the clinical circumstances, as well as cervical exam. Although it has been 13 years since her last delivery, the patient has had 5 vaginal deliveries, so her labor and second stage may be quick. However, some studies show that in women with a long interval since the last delivery, the labor may be longer than expected. In the case of Ms. Peterson, that may be relevant as a long induction, in a grand multipara at 42 years old, may be etiologic for obstetrical hemorrhage. In the setting of a cardiomyopathy, this may increase the risk to the mother. Patient will be on l+D until 4 cm then transferred to OR for labor. Will have arterial line, can have epidural. If CS will have GETA. Hemorrhage kit, hysterectomy tray, H-kit all present in OR. Post partum recovery will be in the CCU. Patient should not undergo tubal ligation unless she has a  section to avoid the risks of elective surgery. Patient consented for Mirena. Patient will recover in the CCU postpartum.  - Patient counseled on LARCs and consented for Mirena IUD on 17.  - Plan for interdisciplinary meeting today, recommendations pending.  #UTI  -s/p keflex  -UCx () no growth (f)  -No signs of labor at this time    #MWB  -GI: DASH Diet  -Activity: OOB  -Heme: HSQ 7500 BID for DVT PPX  -T+S    #FWB  -PNV  -NICU consult when closer to delivery  - Tuesday/Thursday NST, Monday BPPs  -GBS negative () A/P: 28 yo  a/w sPEC, s/p pLTCS(), POD#1    Pt currently stable, BP well controlled.   Neuro: Pain controlled, No headache at this time. Magnesium for neuroprotection  CV: Hemodynamically stable, blood pressure well controlled with labetalol 200mg PO TID  Pulm: O2 Sat wnl on RA  GI: Reg diet  : UOP adequate  Heme: SCDs for DVT ppx  ID: Afebrile  Dispo: Continue routine care    SANTHOSH Morgan, PGY3

## 2017-12-23 NOTE — PROGRESS NOTE ADULT - SUBJECTIVE AND OBJECTIVE BOX
R3 Antepartum Progess Note HD#25  S: Patient seen and examined at bedside. No acute overnight events. Denies LOF/VB/ctx.  +FM.     MEDICATIONS  (STANDING):  acetaminophen   Tablet. 975 milliGRAM(s) Oral every 6 hours  acetaminophen   Tablet. 975 milliGRAM(s) Oral every 6 hours  diphtheria/tetanus/pertussis (acellular) Vaccine (ADAcel) 0.5 milliLiter(s) IntraMuscular once  fentaNYL (3 MICROgram(s)/mL) + BUpivacaine 0.01% in 0.9% Sodium Chloride PCEA 250 milliLiter(s) Epidural PCA Continuous  ferrous    sulfate 325 milliGRAM(s) Oral daily  heparin  Injectable 5000 Unit(s) SubCutaneous every 12 hours  ibuprofen  Tablet 600 milliGRAM(s) Oral every 6 hours  ibuprofen  Tablet 600 milliGRAM(s) Oral every 6 hours  ketorolac   Injectable 30 milliGRAM(s) IV Push every 6 hours  lactated ringers. 1000 milliLiter(s) (125 mL/Hr) IV Continuous <Continuous>  lactated ringers. 1000 milliLiter(s) (125 mL/Hr) IV Continuous <Continuous>  lactated ringers. 1000 milliLiter(s) (125 mL/Hr) IV Continuous <Continuous>  lidocaine 1% Injectable 0.2 milliLiter(s) Local Injection once  magnesium sulfate Infusion 2 Gm/Hr (50 mL/Hr) IV Continuous <Continuous>  oxyCODONE    IR 5 milliGRAM(s) Oral every 3 hours  oxyCODONE    IR 5 milliGRAM(s) Oral every 3 hours  oxytocin Infusion 333.333 milliUNIT(s)/Min (1000 mL/Hr) IV Continuous <Continuous>  oxytocin Infusion 41.667 milliUNIT(s)/Min (125 mL/Hr) IV Continuous <Continuous>  oxytocin Infusion 41.667 milliUNIT(s)/Min (125 mL/Hr) IV Continuous <Continuous>  oxytocin Infusion 333.333 milliUNIT(s)/Min (1000 mL/Hr) IV Continuous <Continuous>  oxytocin Infusion 41.667 milliUNIT(s)/Min (125 mL/Hr) IV Continuous <Continuous>  prenatal multivitamin 1 Tablet(s) Oral daily    MEDICATIONS  (PRN):  dexamethasone  Injectable 4 milliGRAM(s) IV Push every 6 hours PRN Nausea, IF ondansetron is ineffective after 30 - 60 minutes  diphenhydrAMINE   Capsule 25 milliGRAM(s) Oral every 6 hours PRN Itching  docusate sodium 100 milliGRAM(s) Oral two times a day PRN Stool Softening  fentaNYL (3 MICROgram(s)/mL) + BUpivacaine 0.01% in 0.9% Sodium Chloride PCEA Rescue Clinician Bolus 5 milliLiter(s) Epidural every 15 minutes PRN Moderate Pain (4 - 6)  glycerin Suppository - Adult 1 Suppository(s) Rectal at bedtime PRN Constipation  lanolin Ointment 1 Application(s) Topical every 3 hours PRN Sore Nipples  naloxone Injectable 0.1 milliGRAM(s) IV Push every 3 minutes PRN For ANY of the following changes in patient status:  A. RR LESS THAN 10 breaths per minute, B. Oxygen saturation LESS THAN 90%, C. Sedation score of 6  ondansetron Injectable 4 milliGRAM(s) IV Push every 6 hours PRN Nausea  oxyCODONE    IR 5 milliGRAM(s) Oral every 4 hours PRN Severe Pain (7 - 10)  oxyCODONE    IR 5 milliGRAM(s) Oral every 4 hours PRN Severe Pain (7 - 10)  simethicone 80 milliGRAM(s) Chew every 4 hours PRN Gas    Vital Signs Last 24 Hrs  T(C): 36.6 (23 Dec 2017 04:50), Max: 36.9 (22 Dec 2017 18:35)  T(F): 97.9 (23 Dec 2017 04:50), Max: 98.4 (22 Dec 2017 18:35)  HR: 68 (23 Dec 2017 05:05) (62 - 100)  BP: 133/81 (23 Dec 2017 05:05) (109/58 - 146/92)  BP(mean): 102 (23 Dec 2017 05:05) (79 - 116)  RR: 20 (23 Dec 2017 05:05) (16 - 32)  SpO2: 99% (23 Dec 2017 05:05) (95% - 100%)      PHYSICAL EXAM:    GA: NAD, A+0 x 3  CV: RRR  Pulm: CTA BL  Abd: gravid, soft, nontender, nondistended  Extremities: no swelling or calf tenderness    LABS:                        13.2   13.1  )-----------( 167      ( 23 Dec 2017 04:50 )             39.3     12-23    134<L>  |  104  |  11  ----------------------------<  99  4.1   |  20<L>  |  0.61    Ca    7.8<L>      23 Dec 2017 00:46  Mg     4.9     12-23    TPro  5.0<L>  /  Alb  2.4<L>  /  TBili  0.3  /  DBili  x   /  AST  22  /  ALT  11  /  AlkPhos  112  12-23    PT/INR - ( 23 Dec 2017 00:46 )   PT: 9.6 sec;   INR: 0.88 ratio         PTT - ( 23 Dec 2017 00:46 )  PTT:27.2 sec  Urinalysis Basic - ( 23 Dec 2017 01:06 )    Color: x / Appearance: x / SG: x / pH: x  Gluc: x / Ketone: x  / Bili: x / Urobili: x   Blood: x / Protein: x / Nitrite: x   Leuk Esterase: x / RBC: >50 /HPF / WBC 10-25 /HPF   Sq Epi: x / Non Sq Epi: Occasional /HPF / Bacteria: x R3 Postpartum Note  S: Pt examined at the bedside. No acute events overnight. Pt denies headache, visual changes, epigastric pain, RUQ pain. Denies chest pain, SOB, abdominal pain, n/v, fever or chills.     MEDICATIONS  (STANDING):  acetaminophen   Tablet. 975 milliGRAM(s) Oral every 6 hours  acetaminophen   Tablet. 975 milliGRAM(s) Oral every 6 hours  diphtheria/tetanus/pertussis (acellular) Vaccine (ADAcel) 0.5 milliLiter(s) IntraMuscular once  fentaNYL (3 MICROgram(s)/mL) + BUpivacaine 0.01% in 0.9% Sodium Chloride PCEA 250 milliLiter(s) Epidural PCA Continuous  ferrous    sulfate 325 milliGRAM(s) Oral daily  heparin  Injectable 5000 Unit(s) SubCutaneous every 12 hours  ibuprofen  Tablet 600 milliGRAM(s) Oral every 6 hours  ibuprofen  Tablet 600 milliGRAM(s) Oral every 6 hours  ketorolac   Injectable 30 milliGRAM(s) IV Push every 6 hours  lactated ringers. 1000 milliLiter(s) (125 mL/Hr) IV Continuous <Continuous>  lactated ringers. 1000 milliLiter(s) (125 mL/Hr) IV Continuous <Continuous>  lactated ringers. 1000 milliLiter(s) (125 mL/Hr) IV Continuous <Continuous>  lidocaine 1% Injectable 0.2 milliLiter(s) Local Injection once  magnesium sulfate Infusion 2 Gm/Hr (50 mL/Hr) IV Continuous <Continuous>  oxyCODONE    IR 5 milliGRAM(s) Oral every 3 hours  oxyCODONE    IR 5 milliGRAM(s) Oral every 3 hours  oxytocin Infusion 333.333 milliUNIT(s)/Min (1000 mL/Hr) IV Continuous <Continuous>  oxytocin Infusion 41.667 milliUNIT(s)/Min (125 mL/Hr) IV Continuous <Continuous>  oxytocin Infusion 41.667 milliUNIT(s)/Min (125 mL/Hr) IV Continuous <Continuous>  oxytocin Infusion 333.333 milliUNIT(s)/Min (1000 mL/Hr) IV Continuous <Continuous>  oxytocin Infusion 41.667 milliUNIT(s)/Min (125 mL/Hr) IV Continuous <Continuous>  prenatal multivitamin 1 Tablet(s) Oral daily    MEDICATIONS  (PRN):  dexamethasone  Injectable 4 milliGRAM(s) IV Push every 6 hours PRN Nausea, IF ondansetron is ineffective after 30 - 60 minutes  diphenhydrAMINE   Capsule 25 milliGRAM(s) Oral every 6 hours PRN Itching  docusate sodium 100 milliGRAM(s) Oral two times a day PRN Stool Softening  fentaNYL (3 MICROgram(s)/mL) + BUpivacaine 0.01% in 0.9% Sodium Chloride PCEA Rescue Clinician Bolus 5 milliLiter(s) Epidural every 15 minutes PRN Moderate Pain (4 - 6)  glycerin Suppository - Adult 1 Suppository(s) Rectal at bedtime PRN Constipation  lanolin Ointment 1 Application(s) Topical every 3 hours PRN Sore Nipples  naloxone Injectable 0.1 milliGRAM(s) IV Push every 3 minutes PRN For ANY of the following changes in patient status:  A. RR LESS THAN 10 breaths per minute, B. Oxygen saturation LESS THAN 90%, C. Sedation score of 6  ondansetron Injectable 4 milliGRAM(s) IV Push every 6 hours PRN Nausea  oxyCODONE    IR 5 milliGRAM(s) Oral every 4 hours PRN Severe Pain (7 - 10)  oxyCODONE    IR 5 milliGRAM(s) Oral every 4 hours PRN Severe Pain (7 - 10)  simethicone 80 milliGRAM(s) Chew every 4 hours PRN Gas    Vital Signs Last 24 Hrs  T(C): 36.6 (23 Dec 2017 04:50), Max: 36.9 (22 Dec 2017 18:35)  T(F): 97.9 (23 Dec 2017 04:50), Max: 98.4 (22 Dec 2017 18:35)  HR: 68 (23 Dec 2017 05:05) (62 - 100)  BP: 133/81 (23 Dec 2017 05:05) (109/58 - 146/92)  BP(mean): 102 (23 Dec 2017 05:05) (79 - 116)  RR: 20 (23 Dec 2017 05:05) (16 - 32)  SpO2: 99% (23 Dec 2017 05:05) (95% - 100%)      PHYSICAL EXAM:    GA: NAD, A+0 x 3  CV: RRR  Pulm: CTA BL  Abd: gravid, soft, nontender, nondistended  Extremities: no swelling or calf tenderness    LABS:                        13.2   13.1  )-----------( 167      ( 23 Dec 2017 04:50 )             39.3     12-23    134<L>  |  104  |  11  ----------------------------<  99  4.1   |  20<L>  |  0.61    Ca    7.8<L>      23 Dec 2017 00:46  Mg     4.9     12-23    TPro  5.0<L>  /  Alb  2.4<L>  /  TBili  0.3  /  DBili  x   /  AST  22  /  ALT  11  /  AlkPhos  112  12-23    PT/INR - ( 23 Dec 2017 00:46 )   PT: 9.6 sec;   INR: 0.88 ratio         PTT - ( 23 Dec 2017 00:46 )  PTT:27.2 sec  Urinalysis Basic - ( 23 Dec 2017 01:06 )    Color: x / Appearance: x / SG: x / pH: x  Gluc: x / Ketone: x  / Bili: x / Urobili: x   Blood: x / Protein: x / Nitrite: x   Leuk Esterase: x / RBC: >50 /HPF / WBC 10-25 /HPF   Sq Epi: x / Non Sq Epi: Occasional /HPF / Bacteria: x

## 2017-12-23 NOTE — PROGRESS NOTE ADULT - SUBJECTIVE AND OBJECTIVE BOX
R3 Postpartum Note:    Pt examined at the bedside. No acute events overnight.  Pain controlled. Pt not yet ambulating, voiding. Pt tolerating PO. Denies headache, visual changes, epigastric pain, RUQ pain. Denies chest pain, SOB, abdominal pain, n/v, fever or chills.     Physical exam:    Vital Signs Last 24 Hrs  T(C): 36.6 (23 Dec 2017 04:50), Max: 36.9 (22 Dec 2017 18:35)  T(F): 97.9 (23 Dec 2017 04:50), Max: 98.4 (22 Dec 2017 18:35)  HR: 68 (23 Dec 2017 05:05) (62 - 100)  BP: 133/81 (23 Dec 2017 05:05) (109/58 - 146/92)  BP(mean): 102 (23 Dec 2017 05:05) (79 - 116)  RR: 20 (23 Dec 2017 05:05) (16 - 32)  SpO2: 99% (23 Dec 2017 05:05) (95% - 100%)    Gen: NAD  Abdomen: Soft, nontender, no distension , firm uterine fundus at umbilicus.  Incision: Clean, dry, and intact, ANTONIO dressing in place  Pelvic: Normal lochia noted  Ext: No calf tenderness    LABS:                        13.2   13.1  )-----------( 167      ( 23 Dec 2017 04:50 )             39.3                         13.2   13.4  )-----------( 179      ( 23 Dec 2017 00:46 )             37.6                         14.5   9.2   )-----------( 197      ( 22 Dec 2017 15:45 )             41.8                         13.6   9.45  )-----------( 199      ( 21 Dec 2017 19:47 )             41.0     Magnesium, Serum: 4.9 mg/dL (12-23 @ 04:50)    12-23-17 @ 00:46      134<L>  |  104  |  11  ----------------------------<  99  4.1   |  20<L>  |  0.61    12-22-17 @ 15:45      138  |  105  |  13  ----------------------------<  68<L>  4.6   |  21<L>  |  0.79        Ca    7.8<L>      23 Dec 2017 00:46  Ca    8.5      22 Dec 2017 15:45  Mg     4.9<H>     12-23    TPro  5.0<L>  /  Alb  2.4<L>  /  TBili  0.3  /  DBili  x   /  AST  22  /  ALT  11  /  AlkPhos  112  12-23-17 @ 00:46  TPro  6.0  /  Alb  2.9<L>  /  TBili  0.2  /  DBili  x   /  AST  21  /  ALT  15  /  AlkPhos  137<H>  12-22-17 @ 15:45    MEDICATIONS  (STANDING):  acetaminophen   Tablet. 975 milliGRAM(s) Oral every 6 hours  acetaminophen   Tablet. 975 milliGRAM(s) Oral every 6 hours  diphtheria/tetanus/pertussis (acellular) Vaccine (ADAcel) 0.5 milliLiter(s) IntraMuscular once  fentaNYL (3 MICROgram(s)/mL) + BUpivacaine 0.01% in 0.9% Sodium Chloride PCEA 250 milliLiter(s) Epidural PCA Continuous  ferrous    sulfate 325 milliGRAM(s) Oral daily  heparin  Injectable 5000 Unit(s) SubCutaneous every 12 hours  ibuprofen  Tablet 600 milliGRAM(s) Oral every 6 hours  ibuprofen  Tablet 600 milliGRAM(s) Oral every 6 hours  ketorolac   Injectable 30 milliGRAM(s) IV Push every 6 hours  lactated ringers. 1000 milliLiter(s) (125 mL/Hr) IV Continuous <Continuous>  lactated ringers. 1000 milliLiter(s) (125 mL/Hr) IV Continuous <Continuous>  lactated ringers. 1000 milliLiter(s) (125 mL/Hr) IV Continuous <Continuous>  lidocaine 1% Injectable 0.2 milliLiter(s) Local Injection once  magnesium sulfate Infusion 2 Gm/Hr (50 mL/Hr) IV Continuous <Continuous>  oxyCODONE    IR 5 milliGRAM(s) Oral every 3 hours  oxyCODONE    IR 5 milliGRAM(s) Oral every 3 hours  oxytocin Infusion 333.333 milliUNIT(s)/Min (1000 mL/Hr) IV Continuous <Continuous>  oxytocin Infusion 41.667 milliUNIT(s)/Min (125 mL/Hr) IV Continuous <Continuous>  oxytocin Infusion 41.667 milliUNIT(s)/Min (125 mL/Hr) IV Continuous <Continuous>  oxytocin Infusion 333.333 milliUNIT(s)/Min (1000 mL/Hr) IV Continuous <Continuous>  oxytocin Infusion 41.667 milliUNIT(s)/Min (125 mL/Hr) IV Continuous <Continuous>  prenatal multivitamin 1 Tablet(s) Oral daily    MEDICATIONS  (PRN):  dexamethasone  Injectable 4 milliGRAM(s) IV Push every 6 hours PRN Nausea, IF ondansetron is ineffective after 30 - 60 minutes  diphenhydrAMINE   Capsule 25 milliGRAM(s) Oral every 6 hours PRN Itching  docusate sodium 100 milliGRAM(s) Oral two times a day PRN Stool Softening  fentaNYL (3 MICROgram(s)/mL) + BUpivacaine 0.01% in 0.9% Sodium Chloride PCEA Rescue Clinician Bolus 5 milliLiter(s) Epidural every 15 minutes PRN Moderate Pain (4 - 6)  glycerin Suppository - Adult 1 Suppository(s) Rectal at bedtime PRN Constipation  lanolin Ointment 1 Application(s) Topical every 3 hours PRN Sore Nipples  naloxone Injectable 0.1 milliGRAM(s) IV Push every 3 minutes PRN For ANY of the following changes in patient status:  A. RR LESS THAN 10 breaths per minute, B. Oxygen saturation LESS THAN 90%, C. Sedation score of 6  ondansetron Injectable 4 milliGRAM(s) IV Push every 6 hours PRN Nausea  oxyCODONE    IR 5 milliGRAM(s) Oral every 4 hours PRN Severe Pain (7 - 10)  oxyCODONE    IR 5 milliGRAM(s) Oral every 4 hours PRN Severe Pain (7 - 10)  simethicone 80 milliGRAM(s) Chew every 4 hours PRN Gas

## 2017-12-24 DIAGNOSIS — O14.10 SEVERE PRE-ECLAMPSIA, UNSPECIFIED TRIMESTER: ICD-10-CM

## 2017-12-24 RX ORDER — OXYCODONE HYDROCHLORIDE 5 MG/1
5 TABLET ORAL EVERY 4 HOURS
Qty: 0 | Refills: 0 | Status: DISCONTINUED | OUTPATIENT
Start: 2017-12-24 | End: 2017-12-25

## 2017-12-24 RX ORDER — OXYCODONE HYDROCHLORIDE 5 MG/1
5 TABLET ORAL
Qty: 0 | Refills: 0 | Status: DISCONTINUED | OUTPATIENT
Start: 2017-12-24 | End: 2017-12-25

## 2017-12-24 RX ORDER — IBUPROFEN 200 MG
600 TABLET ORAL EVERY 6 HOURS
Qty: 0 | Refills: 0 | Status: DISCONTINUED | OUTPATIENT
Start: 2017-12-24 | End: 2017-12-25

## 2017-12-24 RX ADMIN — HEPARIN SODIUM 5000 UNIT(S): 5000 INJECTION INTRAVENOUS; SUBCUTANEOUS at 06:14

## 2017-12-24 RX ADMIN — Medication 975 MILLIGRAM(S): at 09:02

## 2017-12-24 RX ADMIN — Medication 975 MILLIGRAM(S): at 09:58

## 2017-12-24 RX ADMIN — Medication 975 MILLIGRAM(S): at 20:10

## 2017-12-24 RX ADMIN — Medication 975 MILLIGRAM(S): at 20:55

## 2017-12-24 RX ADMIN — Medication 1 TABLET(S): at 12:55

## 2017-12-24 NOTE — PROGRESS NOTE ADULT - PROBLEM SELECTOR PLAN 1
Neuro: Pain controlled, s/p Mag for seizure ppx  CV: Hemodynamically stable, blood pressure well controlled   Pulm: O2 Sat wnl on RA  GI: Reg diet  : UOP Adequate   Heme: SCDs and HSQ for DVT ppx  ID: Afebrile  Dispo: Continue routine care    Edward Murphy MD R3

## 2017-12-24 NOTE — PROGRESS NOTE ADULT - SUBJECTIVE AND OBJECTIVE BOX
Postpartum Note,  Section  She is a  27y woman who is now post-operative day: 2    27yoF  a/w sev PEC, s/p pLTCS (), POD#2  Subjective:  The patient feels well. Pain controlled. She is OOB. Tolerating reg diet. Voiding spontaneously. Passing flatus. Reports normal vaginal bleeding. She is breast/formula feeding. Denies CP, SOB, HA, N/V, dizziness.      Physical exam:    Vital Signs Last 24 Hrs  T(C): 36.6 (24 Dec 2017 00:21), Max: 36.8 (23 Dec 2017 06:50)  T(F): 97.9 (24 Dec 2017 00:21), Max: 98.2 (23 Dec 2017 06:50)  HR: 98 (24 Dec 2017 00:21) (50 - 98)  BP: 130/90 (24 Dec 2017 00:21) (104/66 - 137/82)  BP(mean): 92 (23 Dec 2017 13:00) (81 - 103)  RR: 18 (24 Dec 2017 00:21) (16 - 22)  SpO2: 98% (24 Dec 2017 00:21) (96% - 100%)    Gen: NAD  Breast: Soft, nontender, not engorged.  Abdomen: Soft, appropriate tender, no distension , firm uterine fundus at umbilicus.  Incision: Clean, dry, and intact   Pelvic: Normal lochia noted  Ext: No calf tenderness, mild bilateral edema    Allergies    Allergy Status Unknown    Intolerances      MEDICATIONS  (STANDING):  acetaminophen   Tablet. 975 milliGRAM(s) Oral every 6 hours  diphtheria/tetanus/pertussis (acellular) Vaccine (ADAcel) 0.5 milliLiter(s) IntraMuscular once  ferrous    sulfate 325 milliGRAM(s) Oral daily  heparin  Injectable 5000 Unit(s) SubCutaneous every 12 hours  ibuprofen  Tablet 600 milliGRAM(s) Oral every 6 hours  oxyCODONE    IR 5 milliGRAM(s) Oral every 3 hours  oxytocin Infusion 41.667 milliUNIT(s)/Min (125 mL/Hr) IV Continuous <Continuous>  prenatal multivitamin 1 Tablet(s) Oral daily    MEDICATIONS  (PRN):  diphenhydrAMINE   Capsule 25 milliGRAM(s) Oral every 6 hours PRN Itching  docusate sodium 100 milliGRAM(s) Oral two times a day PRN Stool Softening  glycerin Suppository - Adult 1 Suppository(s) Rectal at bedtime PRN Constipation  lanolin Ointment 1 Application(s) Topical every 3 hours PRN Sore Nipples  oxyCODONE    IR 5 milliGRAM(s) Oral every 4 hours PRN Severe Pain (7 - 10)  simethicone 80 milliGRAM(s) Chew every 4 hours PRN Gas    LABS:                        13.2   13.1  )-----------( 167      ( 23 Dec 2017 04:50 )             39.3   baso 0.5    eos 0.5    imm gran x      lymph 17.1   mono 6.7    poly 75.2                         13.2   13.4  )-----------( 179      ( 23 Dec 2017 00:46 )             37.6   baso 0.3    eos 0.3    imm gran x      lymph 14.8   mono 6.7    poly 78.0                         14.5   9.2   )-----------( 197      ( 22 Dec 2017 15:45 )             41.8   baso 0.6    eos 0.6    imm gran x      lymph 23.1   mono 6.3    poly 69.5                         13.6   9.45  )-----------( 199      ( 21 Dec 2017 19:47 )             41.0   baso x      eos x      imm gran x      lymph x      mono x      poly x          23 Dec 2017 00:46    134<L>  |  104    |  11     ----------------------------<  99     4.1     |  20<L>  |  0.61   22 Dec 2017 15:45    138    |  105    |  13     ----------------------------<  68<L>  4.6     |  21<L>  |  0.79     Ca    7.8<L>      23 Dec 2017 00:46  Ca    8.5        22 Dec 2017 15:45  Mg     5.9<H>     23 Dec 2017 11:51  Mg     4.9<H>     23 Dec 2017 04:50    TPro  5.0<L>  /  Alb  2.4<L>  /  TBili  0.3    /  DBili  x      /  AST  22     /  ALT  11     /  AlkPhos  112    23 Dec 2017 00:46  TPro  6.0    /  Alb  2.9<L>  /  TBili  0.2    /  DBili  x      /  AST  21     /  ALT  15     /  AlkPhos  137<H>  22 Dec 2017 15:45 Postpartum Note,  Section  She is a  27y woman who is now post-operative day: 2    27yoF  a/w sev PEC, s/p pLTCS (), POD#2  Subjective: The patient feels well. Pain controlled. She is OOB. Tolerating reg diet. Voiding spontaneously. Passing flatus. Reports normal vaginal bleeding. She is breast/formula feeding.   Denies CP, SOB, HA, N/V, dizziness.  Denies sx of PEC.     Physical exam:    Vital Signs Last 24 Hrs  T(C): 36.6 (24 Dec 2017 00:21), Max: 36.8 (23 Dec 2017 06:50)  T(F): 97.9 (24 Dec 2017 00:21), Max: 98.2 (23 Dec 2017 06:50)  HR: 98 (24 Dec 2017 00:21) (50 - 98)  BP: 130/90 (24 Dec 2017 00:21) (104/66 - 137/82)  BP(mean): 92 (23 Dec 2017 13:00) (81 - 103)  RR: 18 (24 Dec 2017 00:21) (16 - 22)  SpO2: 98% (24 Dec 2017 00:21) (96% - 100%)    Gen: NAD  Breast: Soft, nontender, not engorged.  Abdomen: Soft, appropriate tender, no distension , firm uterine fundus at umbilicus.  Incision: Clean, dry, and intact with ANTONIO  Pelvic: Normal lochia noted  Ext: No calf tenderness, mild bilateral edema    Allergies    Allergy Status Unknown    Intolerances      MEDICATIONS  (STANDING):  acetaminophen   Tablet. 975 milliGRAM(s) Oral every 6 hours  diphtheria/tetanus/pertussis (acellular) Vaccine (ADAcel) 0.5 milliLiter(s) IntraMuscular once  ferrous    sulfate 325 milliGRAM(s) Oral daily  heparin  Injectable 5000 Unit(s) SubCutaneous every 12 hours  ibuprofen  Tablet 600 milliGRAM(s) Oral every 6 hours  oxyCODONE    IR 5 milliGRAM(s) Oral every 3 hours  oxytocin Infusion 41.667 milliUNIT(s)/Min (125 mL/Hr) IV Continuous <Continuous>  prenatal multivitamin 1 Tablet(s) Oral daily    MEDICATIONS  (PRN):  diphenhydrAMINE   Capsule 25 milliGRAM(s) Oral every 6 hours PRN Itching  docusate sodium 100 milliGRAM(s) Oral two times a day PRN Stool Softening  glycerin Suppository - Adult 1 Suppository(s) Rectal at bedtime PRN Constipation  lanolin Ointment 1 Application(s) Topical every 3 hours PRN Sore Nipples  oxyCODONE    IR 5 milliGRAM(s) Oral every 4 hours PRN Severe Pain (7 - 10)  simethicone 80 milliGRAM(s) Chew every 4 hours PRN Gas    LABS:                        13.2   13.1  )-----------( 167      ( 23 Dec 2017 04:50 )             39.3   baso 0.5    eos 0.5    imm gran x      lymph 17.1   mono 6.7    poly 75.2                         13.2   13.4  )-----------( 179      ( 23 Dec 2017 00:46 )             37.6   baso 0.3    eos 0.3    imm gran x      lymph 14.8   mono 6.7    poly 78.0                         14.5   9.2   )-----------( 197      ( 22 Dec 2017 15:45 )             41.8   baso 0.6    eos 0.6    imm gran x      lymph 23.1   mono 6.3    poly 69.5                         13.6   9.45  )-----------( 199      ( 21 Dec 2017 19:47 )             41.0   baso x      eos x      imm gran x      lymph x      mono x      poly x          23 Dec 2017 00:46    134<L>  |  104    |  11     ----------------------------<  99     4.1     |  20<L>  |  0.61   22 Dec 2017 15:45    138    |  105    |  13     ----------------------------<  68<L>  4.6     |  21<L>  |  0.79     Ca    7.8<L>      23 Dec 2017 00:46  Ca    8.5        22 Dec 2017 15:45  Mg     5.9<H>     23 Dec 2017 11:51  Mg     4.9<H>     23 Dec 2017 04:50    TPro  5.0<L>  /  Alb  2.4<L>  /  TBili  0.3    /  DBili  x      /  AST  22     /  ALT  11     /  AlkPhos  112    23 Dec 2017 00:46  TPro  6.0    /  Alb  2.9<L>  /  TBili  0.2    /  DBili  x      /  AST  21     /  ALT  15     /  AlkPhos  137<H>  22 Dec 2017 15:45

## 2017-12-24 NOTE — PROGRESS NOTE ADULT - SUBJECTIVE AND OBJECTIVE BOX
Day 2 of Anesthesia Pain Management Service    SUBJECTIVE: I'm okay  Pain Scale Score:    [X] Refer to charted pain scores    THERAPY: Epidural Bupivacaine 0.01 % and Fentanyl 3 micrograms/mL     Demand Dose: 3 mL  Lockout: 15 minutes   Continuous Rate:  10 mL    MEDICATIONS  (STANDING):  acetaminophen   Tablet. 975 milliGRAM(s) Oral every 6 hours  diphtheria/tetanus/pertussis (acellular) Vaccine (ADAcel) 0.5 milliLiter(s) IntraMuscular once  ferrous    sulfate 325 milliGRAM(s) Oral daily  heparin  Injectable 5000 Unit(s) SubCutaneous every 12 hours  ibuprofen  Tablet 600 milliGRAM(s) Oral every 6 hours  oxyCODONE    IR 5 milliGRAM(s) Oral every 3 hours  oxytocin Infusion 41.667 milliUNIT(s)/Min (125 mL/Hr) IV Continuous <Continuous>  prenatal multivitamin 1 Tablet(s) Oral daily    MEDICATIONS  (PRN):  diphenhydrAMINE   Capsule 25 milliGRAM(s) Oral every 6 hours PRN Itching  docusate sodium 100 milliGRAM(s) Oral two times a day PRN Stool Softening  glycerin Suppository - Adult 1 Suppository(s) Rectal at bedtime PRN Constipation  lanolin Ointment 1 Application(s) Topical every 3 hours PRN Sore Nipples  oxyCODONE    IR 5 milliGRAM(s) Oral every 4 hours PRN Severe Pain (7 - 10)  simethicone 80 milliGRAM(s) Chew every 4 hours PRN Gas      OBJECTIVE:    Assessment of Epidural Catheter Site: 	    [ ] Dressing intact	[X] Site non-tender	[X] Site without erythema, discharge, edema  [ ] Epidural tubing and connection checked	[X] Gross neurological exam within normal limits  [X] Catheter removed    PT/INR - ( 23 Dec 2017 00:46 )   PT: 9.6 sec;   INR: 0.88 ratio         PTT - ( 23 Dec 2017 00:46 )  PTT:27.2 sec                      13.2   13.1  )-----------( 167      ( 23 Dec 2017 04:50 )             39.3     Vital Signs Last 24 Hrs  T(C): 36.6 (12-24-17 @ 06:37), Max: 36.8 (12-23-17 @ 21:00)  T(F): 97.9 (12-24-17 @ 06:37), Max: 98.2 (12-23-17 @ 21:00)  HR: 85 (12-24-17 @ 06:37) (50 - 98)  BP: 136/82 (12-24-17 @ 06:37) (104/66 - 136/82)  BP(mean): 92 (12-23-17 @ 13:00) (81 - 92)  RR: 18 (12-24-17 @ 06:37) (18 - 22)  SpO2: 96% (12-24-17 @ 06:37) (96% - 98%)      Sedation Score:	[X] Alert	[ ] Drowsy	[ ] Arousable  [ ] Asleep  [ ] Unresponsive    Side Effects:	[X] None	[ ] Nausea	[ ] Vomiting  [ ] Pruritus  		[ ] Weakness  [ ] Numbness  [ ] Other:    ASSESSMENT/ PLAN:    Therapy:                         [ ] Continue   [X] Discontinue   [X] Change to PRN Analgesics    Documentation and Verification of current medications:  [X] Done	[ ] Not done, not eligible, reason:    Comments:

## 2017-12-25 ENCOUNTER — TRANSCRIPTION ENCOUNTER (OUTPATIENT)
Age: 27
End: 2017-12-25

## 2017-12-25 VITALS
HEART RATE: 67 BPM | SYSTOLIC BLOOD PRESSURE: 134 MMHG | OXYGEN SATURATION: 98 % | RESPIRATION RATE: 18 BRPM | DIASTOLIC BLOOD PRESSURE: 85 MMHG | TEMPERATURE: 98 F

## 2017-12-25 LAB
BASOPHILS # BLD AUTO: 0.1 K/UL — SIGNIFICANT CHANGE UP (ref 0–0.2)
BASOPHILS NFR BLD AUTO: 0.6 % — SIGNIFICANT CHANGE UP (ref 0–2)
EOSINOPHIL # BLD AUTO: 0.2 K/UL — SIGNIFICANT CHANGE UP (ref 0–0.5)
EOSINOPHIL NFR BLD AUTO: 2.4 % — SIGNIFICANT CHANGE UP (ref 0–6)
HCT VFR BLD CALC: 37.5 % — SIGNIFICANT CHANGE UP (ref 34.5–45)
HGB BLD-MCNC: 12.3 G/DL — SIGNIFICANT CHANGE UP (ref 11.5–15.5)
LYMPHOCYTES # BLD AUTO: 2 K/UL — SIGNIFICANT CHANGE UP (ref 1–3.3)
LYMPHOCYTES # BLD AUTO: 21 % — SIGNIFICANT CHANGE UP (ref 13–44)
MCHC RBC-ENTMCNC: 31.1 PG — SIGNIFICANT CHANGE UP (ref 27–34)
MCHC RBC-ENTMCNC: 32.6 GM/DL — SIGNIFICANT CHANGE UP (ref 32–36)
MCV RBC AUTO: 95.2 FL — SIGNIFICANT CHANGE UP (ref 80–100)
MONOCYTES # BLD AUTO: 0.5 K/UL — SIGNIFICANT CHANGE UP (ref 0–0.9)
MONOCYTES NFR BLD AUTO: 5.4 % — SIGNIFICANT CHANGE UP (ref 2–14)
NEUTROPHILS # BLD AUTO: 6.7 K/UL — SIGNIFICANT CHANGE UP (ref 1.8–7.4)
NEUTROPHILS NFR BLD AUTO: 70.5 % — SIGNIFICANT CHANGE UP (ref 43–77)
PLATELET # BLD AUTO: 205 K/UL — SIGNIFICANT CHANGE UP (ref 150–400)
RBC # BLD: 3.95 M/UL — SIGNIFICANT CHANGE UP (ref 3.8–5.2)
RBC # FLD: 13.4 % — SIGNIFICANT CHANGE UP (ref 10.3–14.5)
WBC # BLD: 9.6 K/UL — SIGNIFICANT CHANGE UP (ref 3.8–10.5)
WBC # FLD AUTO: 9.6 K/UL — SIGNIFICANT CHANGE UP (ref 3.8–10.5)

## 2017-12-25 PROCEDURE — 84156 ASSAY OF PROTEIN URINE: CPT

## 2017-12-25 PROCEDURE — 59050 FETAL MONITOR W/REPORT: CPT

## 2017-12-25 PROCEDURE — 83735 ASSAY OF MAGNESIUM: CPT

## 2017-12-25 PROCEDURE — 81001 URINALYSIS AUTO W/SCOPE: CPT

## 2017-12-25 PROCEDURE — 85384 FIBRINOGEN ACTIVITY: CPT

## 2017-12-25 PROCEDURE — 85027 COMPLETE CBC AUTOMATED: CPT

## 2017-12-25 PROCEDURE — 59025 FETAL NON-STRESS TEST: CPT

## 2017-12-25 PROCEDURE — 83615 LACTATE (LD) (LDH) ENZYME: CPT

## 2017-12-25 PROCEDURE — 85730 THROMBOPLASTIN TIME PARTIAL: CPT

## 2017-12-25 PROCEDURE — 85610 PROTHROMBIN TIME: CPT

## 2017-12-25 PROCEDURE — 84550 ASSAY OF BLOOD/URIC ACID: CPT

## 2017-12-25 PROCEDURE — 86780 TREPONEMA PALLIDUM: CPT

## 2017-12-25 PROCEDURE — 80053 COMPREHEN METABOLIC PANEL: CPT

## 2017-12-25 RX ORDER — ACETAMINOPHEN 500 MG
3 TABLET ORAL
Qty: 0 | Refills: 0 | DISCHARGE
Start: 2017-12-25

## 2017-12-25 RX ORDER — IBUPROFEN 200 MG
1 TABLET ORAL
Qty: 0 | Refills: 0 | DISCHARGE
Start: 2017-12-25

## 2017-12-25 RX ORDER — ASPIRIN/CALCIUM CARB/MAGNESIUM 324 MG
1 TABLET ORAL
Qty: 0 | Refills: 0 | COMMUNITY

## 2017-12-25 RX ADMIN — Medication 975 MILLIGRAM(S): at 05:37

## 2017-12-25 RX ADMIN — Medication 1 TABLET(S): at 12:23

## 2017-12-25 RX ADMIN — HEPARIN SODIUM 5000 UNIT(S): 5000 INJECTION INTRAVENOUS; SUBCUTANEOUS at 05:37

## 2017-12-25 NOTE — PROGRESS NOTE ADULT - ASSESSMENT
A/P: 28 yo  a/w sPEC, s/p pLTCS(), POD#3  Pt currently stable, BP well controlled.   Neuro: Pain controlled, No headache at this time. s/p Magnesium for neuroprotection  CV: Hemodynamically stable, blood pressure well controlled with labetalol 200mg PO TID  Pulm: O2 Sat wnl on RA  GI: Reg diet  : Voiding  Heme: SCDs for DVT ppx  ID: Afebrile  Dispo: Start d/c planning    SANTHOSH Morgan, PGY3

## 2017-12-25 NOTE — DISCHARGE NOTE OB - MEDICATION SUMMARY - MEDICATIONS TO TAKE
I will START or STAY ON the medications listed below when I get home from the hospital:    acetaminophen 325 mg oral tablet  -- 3 tab(s) by mouth every 6 hours  -- Indication: For Pain    ibuprofen 600 mg oral tablet  -- 1 tab(s) by mouth every 6 hours  -- Indication: For Pain    Tylenol 325 mg oral capsule  -- 3 cap(s) by mouth every 6 hours  -- Indication: For Pain    Motrin  -- 600 milligram(s) by mouth every 6 hours  -- Indication: For Pain

## 2017-12-25 NOTE — PROGRESS NOTE ADULT - ATTENDING COMMENTS
The patient was seen and examined by me. I agree with the above assessment and plan.  Bp controlled on labetalol.  No symptoms of PEC.  Pt underwent a C/S without any complications.  She met all her milestones in regards to her vitals, postpartum labs, diet, ambulation, pain management. Follow up discussed. Pt was discharged home on POD#3
pt seen and examined. Agree with resident note  clinically improved today  will cont to monitor closely.  PLEE
The patient was seen and examined by me. I agree with the above assessment and plan.

## 2017-12-25 NOTE — DISCHARGE NOTE OB - PATIENT PORTAL LINK FT
“You can access the [facility] Patient Portal, offered by [facility], by registering with the following website: [email or web address]”

## 2017-12-25 NOTE — DISCHARGE NOTE OB - MATERIALS PROVIDED
[facility] [place] Screening Program/Shaken Baby Prevention Handout/Breastfeeding Guide and Packet/Birth Certificate Instructions/Guide to Postpartum Care/[facility] Hearing Screen Program/  Immunization Record/Discharge Medication Information for Patients and Families Pocket Guide

## 2017-12-25 NOTE — PROGRESS NOTE ADULT - SUBJECTIVE AND OBJECTIVE BOX
R3 Postpartum Note  S: Pt examined at the bedside. No acute events overnight.  Pt is ambulating, voiding,  and tolerating PO. Pt denies headache, visual changes, epigastric pain, RUQ pain. Denies chest pain, SOB, abdominal pain, n/v, fever or chills.   MEDICATIONS  (STANDING):  acetaminophen   Tablet. 975 milliGRAM(s) Oral every 6 hours  diphtheria/tetanus/pertussis (acellular) Vaccine (ADAcel) 0.5 milliLiter(s) IntraMuscular once  ferrous    sulfate 325 milliGRAM(s) Oral daily  heparin  Injectable 5000 Unit(s) SubCutaneous every 12 hours  ibuprofen  Tablet 600 milliGRAM(s) Oral every 6 hours  oxyCODONE    IR 5 milliGRAM(s) Oral every 3 hours  oxytocin Infusion 41.667 milliUNIT(s)/Min (125 mL/Hr) IV Continuous <Continuous>  prenatal multivitamin 1 Tablet(s) Oral daily    MEDICATIONS  (PRN):  diphenhydrAMINE   Capsule 25 milliGRAM(s) Oral every 6 hours PRN Itching  docusate sodium 100 milliGRAM(s) Oral two times a day PRN Stool Softening  glycerin Suppository - Adult 1 Suppository(s) Rectal at bedtime PRN Constipation  lanolin Ointment 1 Application(s) Topical every 3 hours PRN Sore Nipples  oxyCODONE    IR 5 milliGRAM(s) Oral every 4 hours PRN Severe Pain (7 - 10)  simethicone 80 milliGRAM(s) Chew every 4 hours PRN Gas      Vital Signs Last 24 Hrs  T(C): 36.4 (25 Dec 2017 01:00), Max: 37.2 (24 Dec 2017 09:00)  T(F): 97.6 (25 Dec 2017 01:00), Max: 99 (24 Dec 2017 09:00)  HR: 69 (25 Dec 2017 01:00) (69 - 87)  BP: 135/89 (25 Dec 2017 01:00) (124/82 - 155/95)  BP(mean): --  RR: 18 (25 Dec 2017 01:00) (18 - 18)  SpO2: 98% (25 Dec 2017 01:00) (96% - 98%)    PHYSICAL EXAM:    GA: NAD, A+0 x 3  CV: RRR  Pulm: CTA BL  Abd: soft, nontender, nondistended, no rebound or guarding,   Incision: clean, dry and intact; steri-strips in place  Uterus: Fundus midline; firm at two fb below umbilicus  : lochia WNL  Extremities: no swelling or calf tenderness    LABS:      Mg     5.9     12-23

## 2017-12-25 NOTE — DISCHARGE NOTE OB - CARE PLAN
Principal Discharge DX:	 delivery delivered  Goal:	recovery  Instructions for follow-up, activity and diet:	Follow up with Dr. Katz in two weeks, activity as tolerated, regular diet  Secondary Diagnosis:	Preeclampsia, severe

## 2019-05-16 ENCOUNTER — RESULT REVIEW (OUTPATIENT)
Age: 29
End: 2019-05-16

## 2019-06-19 ENCOUNTER — APPOINTMENT (OUTPATIENT)
Dept: ANTEPARTUM | Facility: CLINIC | Age: 29
End: 2019-06-19
Payer: COMMERCIAL

## 2019-06-19 ENCOUNTER — ASOB RESULT (OUTPATIENT)
Age: 29
End: 2019-06-19

## 2019-06-19 PROCEDURE — 76801 OB US < 14 WKS SINGLE FETUS: CPT

## 2019-06-25 ENCOUNTER — TRANSCRIPTION ENCOUNTER (OUTPATIENT)
Age: 29
End: 2019-06-25

## 2019-06-25 ENCOUNTER — OUTPATIENT (OUTPATIENT)
Dept: OUTPATIENT SERVICES | Facility: HOSPITAL | Age: 29
LOS: 1 days | End: 2019-06-25
Payer: COMMERCIAL

## 2019-06-25 VITALS
TEMPERATURE: 98 F | WEIGHT: 153 LBS | OXYGEN SATURATION: 97 % | HEART RATE: 83 BPM | RESPIRATION RATE: 16 BRPM | HEIGHT: 63 IN | DIASTOLIC BLOOD PRESSURE: 75 MMHG | SYSTOLIC BLOOD PRESSURE: 113 MMHG

## 2019-06-25 DIAGNOSIS — Z01.818 ENCOUNTER FOR OTHER PREPROCEDURAL EXAMINATION: ICD-10-CM

## 2019-06-25 DIAGNOSIS — Z98.891 HISTORY OF UTERINE SCAR FROM PREVIOUS SURGERY: Chronic | ICD-10-CM

## 2019-06-25 DIAGNOSIS — Z34.90 ENCOUNTER FOR SUPERVISION OF NORMAL PREGNANCY, UNSPECIFIED, UNSPECIFIED TRIMESTER: ICD-10-CM

## 2019-06-25 DIAGNOSIS — Z34.80 ENCOUNTER FOR SUPERVISION OF OTHER NORMAL PREGNANCY, UNSPECIFIED TRIMESTER: ICD-10-CM

## 2019-06-25 LAB
BLD GP AB SCN SERPL QL: NEGATIVE — SIGNIFICANT CHANGE UP
RH IG SCN BLD-IMP: POSITIVE — SIGNIFICANT CHANGE UP

## 2019-06-25 PROCEDURE — 86901 BLOOD TYPING SEROLOGIC RH(D): CPT

## 2019-06-25 PROCEDURE — 85027 COMPLETE CBC AUTOMATED: CPT

## 2019-06-25 PROCEDURE — 86900 BLOOD TYPING SEROLOGIC ABO: CPT

## 2019-06-25 PROCEDURE — G0463: CPT

## 2019-06-25 PROCEDURE — 86850 RBC ANTIBODY SCREEN: CPT

## 2019-06-25 RX ORDER — SODIUM CHLORIDE 9 MG/ML
3 INJECTION INTRAMUSCULAR; INTRAVENOUS; SUBCUTANEOUS EVERY 8 HOURS
Refills: 0 | Status: DISCONTINUED | OUTPATIENT
Start: 2019-06-26 | End: 2019-07-11

## 2019-06-25 RX ORDER — IBUPROFEN 200 MG
600 TABLET ORAL
Qty: 0 | Refills: 0 | DISCHARGE

## 2019-06-25 RX ORDER — LIDOCAINE HCL 20 MG/ML
0.2 VIAL (ML) INJECTION ONCE
Refills: 0 | Status: DISCONTINUED | OUTPATIENT
Start: 2019-06-26 | End: 2019-07-11

## 2019-06-25 RX ORDER — ACETAMINOPHEN 500 MG
3 TABLET ORAL
Qty: 0 | Refills: 0 | DISCHARGE

## 2019-06-25 NOTE — H&P PST ADULT - HEART RATE (BEATS/MIN)
[de-identified] : 7 yo M with a history of asthma.  Here today for an evaluation for snoring that started 1 year ago\par \par Snoring is worse when sick\par \par Recently started complaining of headaches due to nasal congestion \par \par The patient presents with a history of snoring, with occasional mouth breathing mostly with colds, no GASPING and witnessed apnea at night when sleeping.\par \par THERE IS NO KNOWN FATIGUE OR CONCERNS WITH ENURESIS .There is no difficulty with restlessness and hyperactivity but no difficulty with concentration. \par \par No throat/tonsil infections. \par \par No problems with ear infections, hearing, swallowing or with VPI/Speech/nasal regurgitation.\par \par Passed NBHT AU.\par \par Full term,  via  d/t large for gestational age and breech presentation. Uncomplicated delivery with uncomplicated pregnancy.\par \par No cyanosis, no ETT intubation, no home oxygen requirement, no NICU stay\par  83

## 2019-06-25 NOTE — H&P PST ADULT - NSANTHOSAYNRD_GEN_A_CORE
No. ISABELL screening performed.  STOP BANG Legend: 0-2 = LOW Risk; 3-4 = INTERMEDIATE Risk; 5-8 = HIGH Risk

## 2019-06-25 NOTE — H&P PST ADULT - NEUROLOGICAL DETAILS
responds to verbal commands/sensation intact/responds to pain/normal strength/alert and oriented x 3

## 2019-06-25 NOTE — H&P PST ADULT - HISTORY OF PRESENT ILLNESS
29 year old female, approximately 11 weeks gestation, presents for dilation/suction and curettage for IUP. Pt. reports recent sonogram revealed fetal abnormality.

## 2019-06-26 ENCOUNTER — OUTPATIENT (OUTPATIENT)
Dept: OUTPATIENT SERVICES | Facility: HOSPITAL | Age: 29
LOS: 1 days | End: 2019-06-26
Payer: COMMERCIAL

## 2019-06-26 ENCOUNTER — RESULT REVIEW (OUTPATIENT)
Age: 29
End: 2019-06-26

## 2019-06-26 VITALS
OXYGEN SATURATION: 100 % | TEMPERATURE: 98 F | HEART RATE: 83 BPM | WEIGHT: 153 LBS | DIASTOLIC BLOOD PRESSURE: 75 MMHG | SYSTOLIC BLOOD PRESSURE: 113 MMHG | HEIGHT: 63 IN | RESPIRATION RATE: 18 BRPM

## 2019-06-26 VITALS
SYSTOLIC BLOOD PRESSURE: 114 MMHG | OXYGEN SATURATION: 100 % | HEART RATE: 78 BPM | RESPIRATION RATE: 18 BRPM | DIASTOLIC BLOOD PRESSURE: 70 MMHG

## 2019-06-26 DIAGNOSIS — Z34.80 ENCOUNTER FOR SUPERVISION OF OTHER NORMAL PREGNANCY, UNSPECIFIED TRIMESTER: ICD-10-CM

## 2019-06-26 DIAGNOSIS — Z98.891 HISTORY OF UTERINE SCAR FROM PREVIOUS SURGERY: Chronic | ICD-10-CM

## 2019-06-26 LAB
HCT VFR BLD CALC: 39.4 % — SIGNIFICANT CHANGE UP (ref 34.5–45)
HGB BLD-MCNC: 12.7 G/DL — SIGNIFICANT CHANGE UP (ref 11.5–15.5)
MCHC RBC-ENTMCNC: 28 PG — SIGNIFICANT CHANGE UP (ref 27–34)
MCHC RBC-ENTMCNC: 32.2 GM/DL — SIGNIFICANT CHANGE UP (ref 32–36)
MCV RBC AUTO: 86.8 FL — SIGNIFICANT CHANGE UP (ref 80–100)
PLATELET # BLD AUTO: 257 K/UL — SIGNIFICANT CHANGE UP (ref 150–400)
RBC # BLD: 4.54 M/UL — SIGNIFICANT CHANGE UP (ref 3.8–5.2)
RBC # FLD: 13.9 % — SIGNIFICANT CHANGE UP (ref 10.3–14.5)
WBC # BLD: 8.89 K/UL — SIGNIFICANT CHANGE UP (ref 3.8–10.5)
WBC # FLD AUTO: 8.89 K/UL — SIGNIFICANT CHANGE UP (ref 3.8–10.5)

## 2019-06-26 PROCEDURE — 88304 TISSUE EXAM BY PATHOLOGIST: CPT | Mod: 26

## 2019-06-26 RX ORDER — IBUPROFEN 200 MG
3 TABLET ORAL
Qty: 0 | Refills: 0 | DISCHARGE

## 2019-06-26 RX ORDER — ACETAMINOPHEN 500 MG
2 TABLET ORAL
Qty: 0 | Refills: 0 | DISCHARGE

## 2019-06-26 RX ORDER — ACETAMINOPHEN 500 MG
1000 TABLET ORAL ONCE
Refills: 0 | Status: COMPLETED | OUTPATIENT
Start: 2019-06-26 | End: 2019-06-26

## 2019-06-26 RX ORDER — CELECOXIB 200 MG/1
200 CAPSULE ORAL ONCE
Refills: 0 | Status: COMPLETED | OUTPATIENT
Start: 2019-06-26 | End: 2019-06-26

## 2019-06-26 RX ADMIN — CELECOXIB 200 MILLIGRAM(S): 200 CAPSULE ORAL at 09:30

## 2019-06-26 RX ADMIN — SODIUM CHLORIDE 3 MILLILITER(S): 9 INJECTION INTRAMUSCULAR; INTRAVENOUS; SUBCUTANEOUS at 09:32

## 2019-06-26 RX ADMIN — Medication 1000 MILLIGRAM(S): at 09:30

## 2019-06-26 NOTE — BRIEF OPERATIVE NOTE - OPERATION/FINDINGS
12wk size retroverted uterus.  normal vulva, vagina and cervix.  no adnexal mass palpable on b/m exam

## 2019-06-26 NOTE — ASU DISCHARGE PLAN (ADULT/PEDIATRIC) - ASU DC SPECIAL INSTRUCTIONSFT
******************************************************************************************   You were given intravenous TYLENOL for pain management at 9:30 AM. Please DO NOT take any products containing TYLENOL or ACETAMINOPHEN, such as VICODIN, PERCOCET, EXCEDRIN, and any over-the-counter cold medication for the next 6 hours (until 3:30 PM). DO NOT TAKE MORE THAN 3000 MG OF TYLENOL in a 24 hour period.   ******************************************************************************************   You were given intravenous CELEBREX for pain management at 9:30 AM. Please DO NOT take ibuprofen containing products such as MOTRIN or ADVIL, or any other NSAIDs (Non-Steroidal Anti-Inflammatory Drugs) such as ALEVE for the next 6 hours (until 3:30 PM).   ******************************************************************************************

## 2019-06-26 NOTE — ASU DISCHARGE PLAN (ADULT/PEDIATRIC) - CARE PROVIDER_API CALL
Aureliano Coley)  Obstetrics and Gynecology  [address], [address]  [place] [address]  Phone: [phone]  Fax: [phone]  Follow Up Time:

## 2019-06-26 NOTE — PRE-ANESTHESIA EVALUATION ADULT - BP NONINVASIVE SYSTOLIC (MM HG)
Modify Regimen: Doxycycline 150MG daily, as she was inconsistent with taking 100MG twice daily. If 150MG not covered well, advised patient to go back to taking 100MG twice daily Initiate Treatment: Clindamycin lotion once to twice daily Discontinue Regimen: Aczone Detail Level: Zone Continue Regimen: Tretinoin 0.025% cream nightly as tolerated Otc Regimen: Salicylic acid wash daily in shower 97

## 2019-06-26 NOTE — ASU DISCHARGE PLAN (ADULT/PEDIATRIC) - CALL YOUR DOCTOR IF YOU HAVE ANY OF THE FOLLOWING:
Wound/Surgical Site with redness, or foul smelling discharge or pus/Inability to tolerate liquids or foods/Nausea and vomiting that does not stop/Increased irritability or sluggishness/Fever greater than (need to indicate Fahrenheit or Celsius)/Numbness, tingling, color or temperature change to extremity/Pain not relieved by Medications/Unable to urinate/Bleeding that does not stop

## 2019-06-26 NOTE — ASU DISCHARGE PLAN (ADULT/PEDIATRIC) - ACTIVITY LEVEL
Weight bearing as tolerated/Nothing per vagina/No tub baths/No douching/No intercourse/for one week/Nothing per rectum/No weight bearing/No excercise/No heavy lifting/No sports/gym/No tampons

## 2019-06-28 PROCEDURE — 59840 INDUCED ABORTION D&C: CPT

## 2019-06-28 PROCEDURE — 88304 TISSUE EXAM BY PATHOLOGIST: CPT

## 2019-07-01 LAB — SURGICAL PATHOLOGY STUDY: SIGNIFICANT CHANGE UP

## 2019-07-06 ENCOUNTER — TRANSCRIPTION ENCOUNTER (OUTPATIENT)
Age: 29
End: 2019-07-06

## 2019-10-18 NOTE — H&P PST ADULT - EXTREMITIES
Regarding: Visit Follow-Up Question  Contact: [phone]  ----- Message from Mychart, Generic sent at 10/18/2019 10:23 AM EDT -----    I never asked this but what materials are being used for my knee reconstruction?  Do I have an option of materials used?  Thanks.   No cyanosis, clubbing or edema

## 2020-04-25 ENCOUNTER — MESSAGE (OUTPATIENT)
Age: 30
End: 2020-04-25

## 2020-05-04 ENCOUNTER — APPOINTMENT (OUTPATIENT)
Dept: DISASTER EMERGENCY | Facility: HOSPITAL | Age: 30
End: 2020-05-04

## 2020-05-04 PROBLEM — O14.90 UNSPECIFIED PRE-ECLAMPSIA, UNSPECIFIED TRIMESTER: Chronic | Status: ACTIVE | Noted: 2019-06-25

## 2020-05-05 LAB
SARS-COV-2 IGG SERPL IA-ACNC: <0.1 INDEX
SARS-COV-2 IGG SERPL QL IA: NEGATIVE

## 2020-06-15 NOTE — ASU PREOP CHECKLIST - PATIENT SENT TO
am  6/15/2020 6:35 AM    Admit Date: 6/14/2020    Admit Diagnosis: Dyspnea [R06.00]      Subjective:    Patient admitted with new onset worsening ccc 3-4 angina.  Cath planned    Objective:      Visit Vitals  /66 (BP 1 Location: Left arm, BP Patient Position: At rest)   Pulse 83   Temp 98 °F (36.7 °C)   Resp 16   Ht 5' 4\" (1.626 m)   Wt 87.6 kg (193 lb 1.6 oz)   SpO2 94%   BMI 33.15 kg/m²       ROS:  General ROS: negative for - chills  Hematological and Lymphatic ROS: negative for - blood clots or jaundice  Respiratory ROS: positive for - shortness of breath  Cardiovascular ROS: positive for - chest pain and dyspnea on exertion  Gastrointestinal ROS: no abdominal pain, change in bowel habits, or black or bloody stools  Neurological ROS: no TIA or stroke symptoms    Physical Exam:    Physical Examination: General appearance - alert, well appearing, and in no distress  Mental status - alert, oriented to person, place, and time  Eyes - pupils equal and reactive, extraocular eye movements intact  Neck/lymph - supple, no significant adenopathy  Chest/CV - clear to auscultation, no wheezes, rales or rhonchi, symmetric air entry  Heart - normal rate, regular rhythm, normal S1, S2, no murmurs, rubs, clicks or gallops  Abdomen/GI - soft, nontender, nondistended, no masses or organomegaly  Musculoskeletal - no joint tenderness, deformity or swelling  Extremities - peripheral pulses normal, no pedal edema, no clubbing or cyanosis  Skin - normal coloration and turgor, no rashes, no suspicious skin lesions noted    Current Facility-Administered Medications   Medication Dose Route Frequency    albuterol-ipratropium (DUO-NEB) 2.5 MG-0.5 MG/3 ML  3 mL Nebulization Q4H PRN    atenoloL (TENORMIN) tablet 50 mg  50 mg Oral DAILY    baclofen (LIORESAL) tablet 10 mg  10 mg Oral TID PRN    escitalopram oxalate (LEXAPRO) tablet 10 mg  10 mg Oral DAILY    fluticasone propionate (FLONASE) 50 mcg/actuation nasal spray 2 Spray  2 Dinosaur Both Nostrils DAILY    losartan (COZAAR) tablet 25 mg  25 mg Oral DAILY    montelukast (SINGULAIR) tablet 10 mg  10 mg Oral QHS    sodium chloride (NS) flush 5-40 mL  5-40 mL IntraVENous Q8H    sodium chloride (NS) flush 5-40 mL  5-40 mL IntraVENous PRN    aspirin chewable tablet 81 mg  81 mg Oral DAILY    nitroglycerin (NITROBID) 2 % ointment 1 Inch  1 Inch Topical Q6H    nitroglycerin (NITROSTAT) tablet 0.4 mg  0.4 mg SubLINGual Q5MIN PRN    morphine injection 2 mg  2 mg IntraVENous Q4H PRN    acetaminophen (TYLENOL) tablet 650 mg  650 mg Oral Q4H PRN    naloxone (NARCAN) injection 0.4 mg  0.4 mg IntraVENous PRN    ondansetron (ZOFRAN) injection 4 mg  4 mg IntraVENous Q4H PRN    alum-mag hydroxide-simeth (MYLANTA) oral suspension 30 mL  30 mL Oral Q4H PRN    diphenhydrAMINE (BENADRYL) capsule 25 mg  25 mg Oral Q6H PRN    0.9% sodium chloride infusion  75 mL/hr IntraVENous CONTINUOUS       Data Review:   @LABRCNT(Na,K,BUN,CREA,WBC,HGB,HCT,PLT,INR,TRP,TCHOL*,Triglyceride*,LDL*,LDLCPOC HDL*,HDL])@    TELEMETRY: nsr    Assessment/Plan:     Active Problems:    Dyspnea (6/14/2020) monitor      CCC 4 angina. Cath planned Pt set up for procedure. Risks benefits and alternatives discussed. Pt agrees to proceed. Risks of bleeding infection emergent surgical procedure loss of life or limb renal failure and other known risks discussed. Pt agrees to proceed and agrees to sign consent form.     Labs reviewed       Pawan Lester MD operating room

## 2020-06-22 NOTE — H&P PST ADULT - FALL HARM RISK CONCLUSION
Esophagogastroduodenoscopy Procedure Note    Place of Service: [facility]    Patient Name: Naomi Byers    Date of Procedure: 6/22/2020    Surgeon: Irving Lowe MD    Referring Physician: Claudine Mcnair MD    Primary Care Provider: Claudine Mcnair MD    Operative Procedure: EGD - esophagogastroduodenoscopy    Preoperative Diagnosis: Abdominal Pain and GERD    Postoperative Diagnosis: Normal    Anesthesia Medications administered: as per Anesthesia      Procedure events  Sedation Start Time: As per anesthesia.  Event Tracking     Panel 1     Procedure : ESOPHAGOGASTRODUODENOSCOPY      Event Time In    Procedure Start 1318    Procedure End 1323               Endoscope:  Reference chart.    Accessories: Biopsy Forceps    Procedure Description:?The patient was placed in the left lateral position and monitored continuously with automatic blood pressure, ECG tracing, pulse oximetry monitoring and direct observations. Bite block placed and oxygen administered by a nasal cannula as needed. Medications were administered incrementally over the course of the procedure to achieve an adequate level of conscious sedation. After adequate sedation, the endoscope was carefully introduced into the oropharynx and passed in to the esophagus. There was normal pharyngeal and laryngeal structure.  The esophagus and GE junction were carefully examined. After advancement of the endoscope into the stomach, a careful examination was performed, including views of the antrum, incisura angularis, corpus and retroflexed views of the cardia and fundus.?   The pylorus was then intubated without any difficulty and the endoscope was advanced to the second portion of the duodenum. Careful examination of the second portion of the duodenum and the bulb was then performed. Findings and intervention are detailed below. The stomach was then decompressed and the endoscope withdrawn.  Overall, the patient tolerated the procedure  well without undue discomfort, hypotension or desaturation.        Esophagus:   The Z-line was measured at 37 cm, GEJ at 37 cm from the incisors.?  Normal and Biopsies obtained    Stomach:  Normal and Biopsies obtained    Duodenum/small bowel:   Normal    Estimated Blood Loss:   None     Interventions:   Biopsy (single or multiple)    Complications: no    Impression:   1. Normal     Recommendations:   Await pathology results and Resume previous diet     Universal Safety Interventions

## 2021-01-17 NOTE — DISCHARGE NOTE OB - HOSPITAL COURSE
27 P2 s/p pltcs for spec, see operative note for details of procedure.  Patient was on Mg until 24 h PP, discharged home in stable condition with appropriate BPs. PEC precautions given
Not Applicable

## 2021-04-18 NOTE — H&P PST ADULT - MALLAMPATI CLASS
Patient answered YES  to at least one of above 3 questions.
Class II - visualization of the soft palate, fauces, and uvula

## 2022-01-18 NOTE — CHART NOTE - NSCHARTNOTEFT_GEN_A_CORE
Patient is 27y  status post rLTCS with preeclampsia. Patient seen for elevated report from RN of low UOP, elevated BPs 140/80-90s.    Patient sitting comfortably in bed. Admits to previous HA, nausea, and episode of vomiting that occurred with elevated BPs. After receiving Zofran and sitting up patient is now comfortable and her nausea and HA have resolved. Denies blurry vision, RUQ/epigastric pain, dizziness/lightheadedness, f/c.     Vital Signs Last 24 Hrs  T(C): 36.8 (22 Dec 2017 20:35), Max: 36.9 (22 Dec 2017 18:35)  T(F): 98.2 (22 Dec 2017 20:35), Max: 98.4 (22 Dec 2017 18:35)  HR: 96 (22 Dec 2017 21:20) (64 - 100)  BP: 146/92 (22 Dec 2017 21:20) (112/76 - 146/92)  BP(mean): 110 (22 Dec 2017 21:20) (88 - 110)  RR: 18 (22 Dec 2017 21:20) (18 - 18)  SpO2: 99% (22 Dec 2017 21:20) (97% - 100%)    I&O's Detail    22 Dec 2017 07:01  -  22 Dec 2017 21:47  --------------------------------------------------------  IN:    Lactated Ringers IV Bolus: 1000 mL    oxytocin Infusion: 1000 mL  Total IN: 2000 mL    OUT:    Estimated Blood Loss: 1000 mL    Indwelling Catheter - Urethral: 140 mL  Total OUT: 1140 mL    Total NET: 860 mL    PE:  VS: /82, hr 80s, O2 sat 100%  Gen: Appears comfortable.  CV: RR s1s2.   Pulm: CTA b/l  Abd: Soft, appropriately tender, fundus firm, scant lochia on pad   Ext: NT b/l    Labs:                        14.5   9.2   )-----------( 197      ( 22 Dec 2017 15:45 )             41.8                         13.6   9.45  )-----------( 199      ( 21 Dec 2017 19:47 )             41.0       PT/INR - ( 22 Dec 2017 15:45 )   PT: 9.5 sec;   INR: 0.87 ratio         PTT - ( 22 Dec 2017 15:45 )  PTT:27.1 sec    Fibrinogen: Fibrinogen Assay: 597 mg/dL ( @ 15:45)    MEDICATIONS  (STANDING):  acetaminophen   Tablet. 975 milliGRAM(s) Oral every 6 hours  acetaminophen   Tablet. 975 milliGRAM(s) Oral every 6 hours  fentaNYL (3 MICROgram(s)/mL) + BUpivacaine 0.01% in 0.9% Sodium Chloride PCEA 250 milliLiter(s) Epidural PCA Continuous  ibuprofen  Tablet 600 milliGRAM(s) Oral every 6 hours  ibuprofen  Tablet 600 milliGRAM(s) Oral every 6 hours  ketorolac   Injectable 30 milliGRAM(s) IV Push every 6 hours  lactated ringers Bolus 500 milliLiter(s) IV Bolus once  lactated ringers. 1000 milliLiter(s) (125 mL/Hr) IV Continuous <Continuous>  lactated ringers. 1000 milliLiter(s) (125 mL/Hr) IV Continuous <Continuous>  lactated ringers. 1000 milliLiter(s) (125 mL/Hr) IV Continuous <Continuous>  oxyCODONE    IR 5 milliGRAM(s) Oral every 3 hours  oxyCODONE    IR 5 milliGRAM(s) Oral every 3 hours  oxytocin Infusion 333.333 milliUNIT(s)/Min (1000 mL/Hr) IV Continuous <Continuous>  oxytocin Infusion 41.667 milliUNIT(s)/Min (125 mL/Hr) IV Continuous <Continuous>  oxytocin Infusion 333.333 milliUNIT(s)/Min (1000 mL/Hr) IV Continuous <Continuous>  oxytocin Infusion 41.667 milliUNIT(s)/Min (125 mL/Hr) IV Continuous <Continuous>      Assessment/Plan: 27y  status post rLTCS with preeclampsia and low urine output postop.  -LR bolus 500cc to be given.  EBL: 1L, IVF IN: 2L  -Zofran for nausea/vomiting prn   -Routine postpartum pain protocol   -Continue to monitor BPs and for signs/symptoms of PEC; BPs currently controlled   -Currently without signs/symptoms of PEC or severe BPs requiring treatement   -Plan for pt to remain in PACU until UOP adequate     d/w Dr. Ihsan Anguiano PGY1            -------------------------------------------------------------------------------------------------------------

## 2022-04-20 ENCOUNTER — TRANSCRIPTION ENCOUNTER (OUTPATIENT)
Age: 32
End: 2022-04-20

## 2022-05-23 ENCOUNTER — NON-APPOINTMENT (OUTPATIENT)
Age: 32
End: 2022-05-23

## 2022-05-24 ENCOUNTER — LABORATORY RESULT (OUTPATIENT)
Age: 32
End: 2022-05-24

## 2022-05-24 ENCOUNTER — NON-APPOINTMENT (OUTPATIENT)
Age: 32
End: 2022-05-24

## 2022-05-24 ENCOUNTER — APPOINTMENT (OUTPATIENT)
Dept: DERMATOLOGY | Facility: CLINIC | Age: 32
End: 2022-05-24
Payer: COMMERCIAL

## 2022-05-24 VITALS — WEIGHT: 134 LBS | BODY MASS INDEX: 23.74 KG/M2 | HEIGHT: 63 IN

## 2022-05-24 DIAGNOSIS — D48.5 NEOPLASM OF UNCERTAIN BEHAVIOR OF SKIN: ICD-10-CM

## 2022-05-24 DIAGNOSIS — L30.9 DERMATITIS, UNSPECIFIED: ICD-10-CM

## 2022-05-24 PROCEDURE — 11102 TANGNTL BX SKIN SINGLE LES: CPT

## 2022-05-24 PROCEDURE — 99204 OFFICE O/P NEW MOD 45 MIN: CPT | Mod: 25

## 2022-05-24 RX ORDER — TACROLIMUS 1 MG/G
0.1 OINTMENT TOPICAL
Qty: 1 | Refills: 2 | Status: ACTIVE | COMMUNITY
Start: 2022-05-24 | End: 1900-01-01

## 2022-05-24 NOTE — HISTORY OF PRESENT ILLNESS
[FreeTextEntry1] : hyperpigmentation of neck [de-identified] : 32 year old F new patient presenting for \par \par 1. Hyperpigmentation of neck. Started in 2019 after getting poison ivy. Asx, not itchy or burning. \par Not on any medications, denies use of fragrance or oils to area. \par Continuing to get new areas of involvement. \par No personal or FH of AICTD, no f/c, joint pain, oral sores or ulcers. 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

## 2022-05-24 NOTE — ASSESSMENT
[FreeTextEntry1] : 1. Dermatitis; flaring. Uncertain diagnosis with uncertain prognosis. \par Favor lichen planus pigmentosus, r/o AICTD, interface dermatitis, actinic LP, photodrug, eczematous vs other \par - Photos taken today for clinical monitoring \par - Biopsy as below for further characterization \par - Start tacrolimus 0.1% ointment BID. SED including burning sensation and black box warning.\par - Photoprotection reviewed including sun-protective behaviors, protective clothing, and the use of OTC TINTED broad-spectrum SPF 30+ sunscreens was advised \par - Further tx pending bx results\par \par 2. Neoplasm of uncertain behavior\par Site: R anterior neck (ddx as above)\par Procedure note: Tangential biopsy \par Informed consent obtained. Risks/benefits/alternatives discussed including but not limited to risk of bleeding, infection, permanent scar, nerve injury and recurrence. Site confirmed. Area prepped with alcohol and anesthetized with 1% lidocaine with epinephrine. Tangential biopsy performed. Aluminum chloride applied for hemostasis. Petrolatum and bandaid applied. Discussed wound care instructions. Patient tolerated well with no immediate complications. Estimated blood loss <5ml. \par \par RTC 3 weeks for review of bx results and further tx \par

## 2022-06-14 ENCOUNTER — NON-APPOINTMENT (OUTPATIENT)
Age: 32
End: 2022-06-14

## 2022-06-14 ENCOUNTER — APPOINTMENT (OUTPATIENT)
Dept: DERMATOLOGY | Facility: CLINIC | Age: 32
End: 2022-06-14

## 2022-06-15 ENCOUNTER — NON-APPOINTMENT (OUTPATIENT)
Age: 32
End: 2022-06-15

## 2022-06-22 ENCOUNTER — RESULT REVIEW (OUTPATIENT)
Age: 32
End: 2022-06-22

## 2022-06-27 ENCOUNTER — APPOINTMENT (OUTPATIENT)
Dept: DERMATOLOGY | Facility: CLINIC | Age: 32
End: 2022-06-27
Payer: COMMERCIAL

## 2022-06-27 DIAGNOSIS — L43.8 OTHER LICHEN PLANUS: ICD-10-CM

## 2022-06-27 PROCEDURE — 99214 OFFICE O/P EST MOD 30 MIN: CPT | Mod: 95

## 2022-06-27 NOTE — ASSESSMENT
[FreeTextEntry1] : Lichen planus pigmentosus, chronic x yrs, not at goal of clear\par will try to get Opzelura - if covered will use for no more than 2 mo (can switch back to tacrolimus at that point)\par if not covered can c/w tacrolimus ointment BID\par reinforced necessity of sunblock usage\par \par This visit was conducted via live synchronous audio/video telehealth with the patient who attested to being located in [facility] where the provider is licensed to practice medicine.\par

## 2022-06-27 NOTE — HISTORY OF PRESENT ILLNESS
[FreeTextEntry1] : LP pigmentosus [de-identified] : Lichen planus pigmentosus, biopsy-proven\par pt has been using tacrolimus since then\par getting a little better\par not spreading

## 2022-06-29 RX ORDER — RUXOLITINIB 15 MG/G
1.5 CREAM TOPICAL
Qty: 1 | Refills: 6 | Status: ACTIVE | COMMUNITY
Start: 2022-06-27

## 2022-07-18 ENCOUNTER — NON-APPOINTMENT (OUTPATIENT)
Age: 32
End: 2022-07-18

## 2022-07-19 ENCOUNTER — NON-APPOINTMENT (OUTPATIENT)
Age: 32
End: 2022-07-19

## 2025-01-24 ENCOUNTER — RESULT REVIEW (OUTPATIENT)
Age: 35
End: 2025-01-24

## 2025-07-01 ENCOUNTER — APPOINTMENT (OUTPATIENT)
Dept: DERMATOLOGY | Facility: CLINIC | Age: 35
End: 2025-07-01

## 2025-08-01 ENCOUNTER — APPOINTMENT (OUTPATIENT)
Dept: DERMATOLOGY | Facility: CLINIC | Age: 35
End: 2025-08-01

## 2025-09-04 ENCOUNTER — APPOINTMENT (OUTPATIENT)
Dept: DERMATOLOGY | Facility: CLINIC | Age: 35
End: 2025-09-04
Payer: COMMERCIAL

## 2025-09-04 DIAGNOSIS — D48.5 NEOPLASM OF UNCERTAIN BEHAVIOR OF SKIN: ICD-10-CM

## 2025-09-04 DIAGNOSIS — Z12.83 ENCOUNTER FOR SCREENING FOR MALIGNANT NEOPLASM OF SKIN: ICD-10-CM

## 2025-09-04 DIAGNOSIS — D22.9 MELANOCYTIC NEVI, UNSPECIFIED: ICD-10-CM

## 2025-09-04 DIAGNOSIS — L43.8 OTHER LICHEN PLANUS: ICD-10-CM

## 2025-09-04 PROCEDURE — 11103 TANGNTL BX SKIN EA SEP/ADDL: CPT | Mod: 59

## 2025-09-04 PROCEDURE — 11102 TANGNTL BX SKIN SINGLE LES: CPT | Mod: 59

## 2025-09-04 PROCEDURE — 99204 OFFICE O/P NEW MOD 45 MIN: CPT | Mod: 25

## 2025-09-04 PROCEDURE — 11900 INJECT SKIN LESIONS </W 7: CPT | Mod: 59

## 2025-09-18 ENCOUNTER — NON-APPOINTMENT (OUTPATIENT)
Age: 35
End: 2025-09-18